# Patient Record
Sex: MALE | Race: WHITE | NOT HISPANIC OR LATINO | Employment: FULL TIME | URBAN - METROPOLITAN AREA
[De-identification: names, ages, dates, MRNs, and addresses within clinical notes are randomized per-mention and may not be internally consistent; named-entity substitution may affect disease eponyms.]

---

## 2017-09-19 ENCOUNTER — APPOINTMENT (OUTPATIENT)
Dept: CT IMAGING | Facility: CLINIC | Age: 52
DRG: 155 | End: 2017-09-19
Attending: EMERGENCY MEDICINE
Payer: COMMERCIAL

## 2017-09-19 ENCOUNTER — HOSPITAL ENCOUNTER (INPATIENT)
Facility: CLINIC | Age: 52
LOS: 2 days | Discharge: HOME | DRG: 155 | End: 2017-09-21
Attending: EMERGENCY MEDICINE | Admitting: INTERNAL MEDICINE
Payer: COMMERCIAL

## 2017-09-19 ENCOUNTER — APPOINTMENT (OUTPATIENT)
Dept: ULTRASOUND IMAGING | Facility: CLINIC | Age: 52
DRG: 155 | End: 2017-09-19
Attending: PHYSICIAN ASSISTANT
Payer: COMMERCIAL

## 2017-09-19 DIAGNOSIS — M54.50 CHRONIC LOW BACK PAIN WITHOUT SCIATICA, UNSPECIFIED BACK PAIN LATERALITY: Primary | ICD-10-CM

## 2017-09-19 DIAGNOSIS — K76.82 HEPATIC ENCEPHALOPATHY (H): ICD-10-CM

## 2017-09-19 DIAGNOSIS — G89.29 CHRONIC LOW BACK PAIN WITHOUT SCIATICA, UNSPECIFIED BACK PAIN LATERALITY: Primary | ICD-10-CM

## 2017-09-19 PROBLEM — G31.2 ALCOHOLIC ENCEPHALOPATHY (H): Status: ACTIVE | Noted: 2017-09-19

## 2017-09-19 LAB
ALBUMIN SERPL-MCNC: 3.9 G/DL (ref 3.4–5)
ALP SERPL-CCNC: 108 U/L (ref 40–150)
ALT SERPL W P-5'-P-CCNC: 78 U/L (ref 0–70)
AMMONIA PLAS-SCNC: 132 UMOL/L (ref 10–50)
AMPHETAMINES UR QL SCN: NEGATIVE
ANION GAP SERPL CALCULATED.3IONS-SCNC: 12 MMOL/L (ref 3–14)
AST SERPL W P-5'-P-CCNC: 46 U/L (ref 0–45)
BARBITURATES UR QL: NEGATIVE
BASOPHILS # BLD AUTO: 0 10E9/L (ref 0–0.2)
BASOPHILS NFR BLD AUTO: 0.9 %
BENZODIAZ UR QL: NEGATIVE
BILIRUB SERPL-MCNC: 0.4 MG/DL (ref 0.2–1.3)
BUN SERPL-MCNC: 6 MG/DL (ref 7–30)
CALCIUM SERPL-MCNC: 8.7 MG/DL (ref 8.5–10.1)
CANNABINOIDS UR QL SCN: NEGATIVE
CHLORIDE SERPL-SCNC: 99 MMOL/L (ref 94–109)
CO2 SERPL-SCNC: 24 MMOL/L (ref 20–32)
COCAINE UR QL: NEGATIVE
CREAT SERPL-MCNC: 0.66 MG/DL (ref 0.66–1.25)
DIFFERENTIAL METHOD BLD: ABNORMAL
EOSINOPHIL # BLD AUTO: 0 10E9/L (ref 0–0.7)
EOSINOPHIL NFR BLD AUTO: 0.9 %
ERYTHROCYTE [DISTWIDTH] IN BLOOD BY AUTOMATED COUNT: 12.3 % (ref 10–15)
ETHANOL SERPL-MCNC: 0.02 G/DL
FERRITIN SERPL-MCNC: 126 NG/ML (ref 26–388)
GFR SERPL CREATININE-BSD FRML MDRD: >90 ML/MIN/1.7M2
GLUCOSE BLDC GLUCOMTR-MCNC: 150 MG/DL (ref 70–99)
GLUCOSE SERPL-MCNC: 150 MG/DL (ref 70–99)
HCT VFR BLD AUTO: 37.7 % (ref 40–53)
HGB BLD-MCNC: 13.7 G/DL (ref 13.3–17.7)
IMM GRANULOCYTES # BLD: 0 10E9/L (ref 0–0.4)
IMM GRANULOCYTES NFR BLD: 0.2 %
INR PPP: 1.05 (ref 0.86–1.14)
IRON SATN MFR SERPL: 20 % (ref 15–46)
IRON SERPL-MCNC: 88 UG/DL (ref 35–180)
LYMPHOCYTES # BLD AUTO: 1.1 10E9/L (ref 0.8–5.3)
LYMPHOCYTES NFR BLD AUTO: 24.2 %
MAGNESIUM SERPL-MCNC: 1.8 MG/DL (ref 1.6–2.3)
MCH RBC QN AUTO: 33.1 PG (ref 26.5–33)
MCHC RBC AUTO-ENTMCNC: 36.3 G/DL (ref 31.5–36.5)
MCV RBC AUTO: 91 FL (ref 78–100)
MONOCYTES # BLD AUTO: 0.4 10E9/L (ref 0–1.3)
MONOCYTES NFR BLD AUTO: 8.9 %
NEUTROPHILS # BLD AUTO: 2.9 10E9/L (ref 1.6–8.3)
NEUTROPHILS NFR BLD AUTO: 64.9 %
NRBC # BLD AUTO: 0 10*3/UL
NRBC BLD AUTO-RTO: 0 /100
OPIATES UR QL SCN: NEGATIVE
PCP UR QL SCN: NEGATIVE
PLATELET # BLD AUTO: 204 10E9/L (ref 150–450)
POTASSIUM SERPL-SCNC: 3.3 MMOL/L (ref 3.4–5.3)
PROT SERPL-MCNC: 7.7 G/DL (ref 6.8–8.8)
RBC # BLD AUTO: 4.14 10E12/L (ref 4.4–5.9)
SODIUM SERPL-SCNC: 135 MMOL/L (ref 133–144)
T4 FREE SERPL-MCNC: 0.66 NG/DL (ref 0.76–1.46)
TIBC SERPL-MCNC: 436 UG/DL (ref 240–430)
TSH SERPL DL<=0.005 MIU/L-ACNC: 0.32 MU/L (ref 0.4–4)
WBC # BLD AUTO: 4.5 10E9/L (ref 4–11)

## 2017-09-19 PROCEDURE — 84439 ASSAY OF FREE THYROXINE: CPT | Performed by: EMERGENCY MEDICINE

## 2017-09-19 PROCEDURE — 25000132 ZZH RX MED GY IP 250 OP 250 PS 637: Performed by: INTERNAL MEDICINE

## 2017-09-19 PROCEDURE — 99285 EMERGENCY DEPT VISIT HI MDM: CPT | Mod: 25

## 2017-09-19 PROCEDURE — 85610 PROTHROMBIN TIME: CPT | Performed by: EMERGENCY MEDICINE

## 2017-09-19 PROCEDURE — 80320 DRUG SCREEN QUANTALCOHOLS: CPT | Performed by: EMERGENCY MEDICINE

## 2017-09-19 PROCEDURE — 25000132 ZZH RX MED GY IP 250 OP 250 PS 637: Performed by: PHYSICIAN ASSISTANT

## 2017-09-19 PROCEDURE — 85025 COMPLETE CBC W/AUTO DIFF WBC: CPT | Performed by: EMERGENCY MEDICINE

## 2017-09-19 PROCEDURE — 25000128 H RX IP 250 OP 636: Performed by: PHYSICIAN ASSISTANT

## 2017-09-19 PROCEDURE — 83735 ASSAY OF MAGNESIUM: CPT | Performed by: EMERGENCY MEDICINE

## 2017-09-19 PROCEDURE — 12000000 ZZH R&B MED SURG/OB

## 2017-09-19 PROCEDURE — 84443 ASSAY THYROID STIM HORMONE: CPT | Performed by: EMERGENCY MEDICINE

## 2017-09-19 PROCEDURE — 99223 1ST HOSP IP/OBS HIGH 75: CPT | Mod: AI | Performed by: PHYSICIAN ASSISTANT

## 2017-09-19 PROCEDURE — 86704 HEP B CORE ANTIBODY TOTAL: CPT | Performed by: EMERGENCY MEDICINE

## 2017-09-19 PROCEDURE — 25000128 H RX IP 250 OP 636: Performed by: EMERGENCY MEDICINE

## 2017-09-19 PROCEDURE — 87340 HEPATITIS B SURFACE AG IA: CPT | Performed by: EMERGENCY MEDICINE

## 2017-09-19 PROCEDURE — 80053 COMPREHEN METABOLIC PANEL: CPT | Performed by: EMERGENCY MEDICINE

## 2017-09-19 PROCEDURE — 25000132 ZZH RX MED GY IP 250 OP 250 PS 637: Performed by: EMERGENCY MEDICINE

## 2017-09-19 PROCEDURE — 80307 DRUG TEST PRSMV CHEM ANLYZR: CPT | Performed by: EMERGENCY MEDICINE

## 2017-09-19 PROCEDURE — 82140 ASSAY OF AMMONIA: CPT | Performed by: EMERGENCY MEDICINE

## 2017-09-19 PROCEDURE — 00000146 ZZHCL STATISTIC GLUCOSE BY METER IP

## 2017-09-19 PROCEDURE — 83540 ASSAY OF IRON: CPT | Performed by: EMERGENCY MEDICINE

## 2017-09-19 PROCEDURE — 25000125 ZZHC RX 250: Performed by: PHYSICIAN ASSISTANT

## 2017-09-19 PROCEDURE — 83550 IRON BINDING TEST: CPT | Performed by: EMERGENCY MEDICINE

## 2017-09-19 PROCEDURE — 76705 ECHO EXAM OF ABDOMEN: CPT

## 2017-09-19 PROCEDURE — 96361 HYDRATE IV INFUSION ADD-ON: CPT

## 2017-09-19 PROCEDURE — 96374 THER/PROPH/DIAG INJ IV PUSH: CPT

## 2017-09-19 PROCEDURE — 70450 CT HEAD/BRAIN W/O DYE: CPT

## 2017-09-19 PROCEDURE — 82728 ASSAY OF FERRITIN: CPT | Performed by: EMERGENCY MEDICINE

## 2017-09-19 PROCEDURE — 86803 HEPATITIS C AB TEST: CPT | Performed by: EMERGENCY MEDICINE

## 2017-09-19 RX ORDER — LANOLIN ALCOHOL/MO/W.PET/CERES
100 CREAM (GRAM) TOPICAL DAILY
Status: DISCONTINUED | OUTPATIENT
Start: 2017-09-20 | End: 2017-09-21 | Stop reason: HOSPADM

## 2017-09-19 RX ORDER — PROCHLORPERAZINE 25 MG
25 SUPPOSITORY, RECTAL RECTAL EVERY 12 HOURS PRN
Status: DISCONTINUED | OUTPATIENT
Start: 2017-09-19 | End: 2017-09-21 | Stop reason: HOSPADM

## 2017-09-19 RX ORDER — ONDANSETRON 4 MG/1
4 TABLET, ORALLY DISINTEGRATING ORAL EVERY 6 HOURS PRN
Status: DISCONTINUED | OUTPATIENT
Start: 2017-09-19 | End: 2017-09-21 | Stop reason: HOSPADM

## 2017-09-19 RX ORDER — CYANOCOBALAMIN 1000 UG/ML
1 INJECTION, SOLUTION INTRAMUSCULAR; SUBCUTANEOUS WEEKLY
COMMUNITY

## 2017-09-19 RX ORDER — DIAZEPAM 5 MG
10 TABLET ORAL EVERY 30 MIN PRN
Status: DISCONTINUED | OUTPATIENT
Start: 2017-09-19 | End: 2017-09-21 | Stop reason: HOSPADM

## 2017-09-19 RX ORDER — HYDROCODONE BITARTRATE AND ACETAMINOPHEN 5; 325 MG/1; MG/1
1 TABLET ORAL EVERY 4 HOURS PRN
Status: DISCONTINUED | OUTPATIENT
Start: 2017-09-19 | End: 2017-09-21 | Stop reason: HOSPADM

## 2017-09-19 RX ORDER — LISINOPRIL 20 MG/1
20 TABLET ORAL DAILY
Status: DISCONTINUED | OUTPATIENT
Start: 2017-09-20 | End: 2017-09-21 | Stop reason: HOSPADM

## 2017-09-19 RX ORDER — DIAZEPAM 10 MG/2ML
5-10 INJECTION, SOLUTION INTRAMUSCULAR; INTRAVENOUS EVERY 30 MIN PRN
Status: DISCONTINUED | OUTPATIENT
Start: 2017-09-19 | End: 2017-09-21 | Stop reason: HOSPADM

## 2017-09-19 RX ORDER — TESTOSTERONE 1.62 MG/G
3 GEL TRANSDERMAL DAILY PRN
COMMUNITY

## 2017-09-19 RX ORDER — AMLODIPINE AND BENAZEPRIL HYDROCHLORIDE 5; 20 MG/1; MG/1
1 CAPSULE ORAL DAILY
COMMUNITY

## 2017-09-19 RX ORDER — FOLIC ACID 1 MG/1
1 TABLET ORAL DAILY
Status: DISCONTINUED | OUTPATIENT
Start: 2017-09-20 | End: 2017-09-21 | Stop reason: HOSPADM

## 2017-09-19 RX ORDER — MULTIPLE VITAMINS W/ MINERALS TAB 9MG-400MCG
1 TAB ORAL DAILY
Status: DISCONTINUED | OUTPATIENT
Start: 2017-09-20 | End: 2017-09-21 | Stop reason: HOSPADM

## 2017-09-19 RX ORDER — SODIUM CHLORIDE 9 MG/ML
1000 INJECTION, SOLUTION INTRAVENOUS CONTINUOUS
Status: DISCONTINUED | OUTPATIENT
Start: 2017-09-19 | End: 2017-09-19

## 2017-09-19 RX ORDER — SODIUM CHLORIDE AND POTASSIUM CHLORIDE 150; 900 MG/100ML; MG/100ML
INJECTION, SOLUTION INTRAVENOUS CONTINUOUS
Status: DISCONTINUED | OUTPATIENT
Start: 2017-09-19 | End: 2017-09-20

## 2017-09-19 RX ORDER — PANTOPRAZOLE SODIUM 40 MG/1
40 TABLET, DELAYED RELEASE ORAL
Status: DISCONTINUED | OUTPATIENT
Start: 2017-09-19 | End: 2017-09-21 | Stop reason: HOSPADM

## 2017-09-19 RX ORDER — POTASSIUM CHLORIDE 1.5 G/1.58G
20-40 POWDER, FOR SOLUTION ORAL
Status: DISCONTINUED | OUTPATIENT
Start: 2017-09-19 | End: 2017-09-21 | Stop reason: HOSPADM

## 2017-09-19 RX ORDER — LIDOCAINE 40 MG/G
CREAM TOPICAL
Status: DISCONTINUED | OUTPATIENT
Start: 2017-09-19 | End: 2017-09-21 | Stop reason: HOSPADM

## 2017-09-19 RX ORDER — ONDANSETRON 2 MG/ML
4 INJECTION INTRAMUSCULAR; INTRAVENOUS EVERY 6 HOURS PRN
Status: DISCONTINUED | OUTPATIENT
Start: 2017-09-19 | End: 2017-09-21 | Stop reason: HOSPADM

## 2017-09-19 RX ORDER — PROCHLORPERAZINE MALEATE 5 MG
5-10 TABLET ORAL EVERY 6 HOURS PRN
Status: DISCONTINUED | OUTPATIENT
Start: 2017-09-19 | End: 2017-09-21 | Stop reason: HOSPADM

## 2017-09-19 RX ORDER — POTASSIUM CHLORIDE 1500 MG/1
20-40 TABLET, EXTENDED RELEASE ORAL
Status: DISCONTINUED | OUTPATIENT
Start: 2017-09-19 | End: 2017-09-21 | Stop reason: HOSPADM

## 2017-09-19 RX ORDER — POTASSIUM CHLORIDE 7.45 MG/ML
10 INJECTION INTRAVENOUS
Status: DISCONTINUED | OUTPATIENT
Start: 2017-09-19 | End: 2017-09-21 | Stop reason: HOSPADM

## 2017-09-19 RX ORDER — OXCARBAZEPINE 300 MG/1
900 TABLET, FILM COATED ORAL EVERY EVENING
Status: DISCONTINUED | OUTPATIENT
Start: 2017-09-19 | End: 2017-09-21 | Stop reason: HOSPADM

## 2017-09-19 RX ORDER — ESOMEPRAZOLE MAGNESIUM 40 MG/1
40 CAPSULE, DELAYED RELEASE ORAL
Status: DISCONTINUED | OUTPATIENT
Start: 2017-09-19 | End: 2017-09-19 | Stop reason: CLARIF

## 2017-09-19 RX ORDER — QUETIAPINE FUMARATE 25 MG/1
25-50 TABLET, FILM COATED ORAL EVERY 6 HOURS PRN
Status: DISCONTINUED | OUTPATIENT
Start: 2017-09-19 | End: 2017-09-21 | Stop reason: HOSPADM

## 2017-09-19 RX ORDER — HYDRALAZINE HYDROCHLORIDE 20 MG/ML
10 INJECTION INTRAMUSCULAR; INTRAVENOUS EVERY 4 HOURS PRN
Status: DISCONTINUED | OUTPATIENT
Start: 2017-09-19 | End: 2017-09-21 | Stop reason: HOSPADM

## 2017-09-19 RX ORDER — ACETAMINOPHEN 325 MG/1
650 TABLET ORAL EVERY 4 HOURS PRN
Status: DISCONTINUED | OUTPATIENT
Start: 2017-09-19 | End: 2017-09-21 | Stop reason: HOSPADM

## 2017-09-19 RX ORDER — MESALAMINE 800 MG/1
2400 TABLET, DELAYED RELEASE ORAL 2 TIMES DAILY
Status: DISCONTINUED | OUTPATIENT
Start: 2017-09-19 | End: 2017-09-21 | Stop reason: HOSPADM

## 2017-09-19 RX ORDER — NICOTINE 21 MG/24HR
1 PATCH, TRANSDERMAL 24 HOURS TRANSDERMAL DAILY
Status: DISCONTINUED | OUTPATIENT
Start: 2017-09-19 | End: 2017-09-21 | Stop reason: HOSPADM

## 2017-09-19 RX ORDER — LANOLIN ALCOHOL/MO/W.PET/CERES
1000 CREAM (GRAM) TOPICAL DAILY
Status: DISCONTINUED | OUTPATIENT
Start: 2017-09-20 | End: 2017-09-21 | Stop reason: HOSPADM

## 2017-09-19 RX ORDER — LACTULOSE 10 G/15ML
20 SOLUTION ORAL 4 TIMES DAILY
Status: DISCONTINUED | OUTPATIENT
Start: 2017-09-19 | End: 2017-09-19

## 2017-09-19 RX ORDER — LORAZEPAM 2 MG/ML
0.5 INJECTION INTRAMUSCULAR ONCE
Status: COMPLETED | OUTPATIENT
Start: 2017-09-19 | End: 2017-09-19

## 2017-09-19 RX ORDER — AMOXICILLIN 250 MG
1-2 CAPSULE ORAL 2 TIMES DAILY PRN
Status: DISCONTINUED | OUTPATIENT
Start: 2017-09-19 | End: 2017-09-19

## 2017-09-19 RX ORDER — CALCIUM CARBONATE 500 MG/1
500-1000 TABLET, CHEWABLE ORAL 4 TIMES DAILY PRN
Status: DISCONTINUED | OUTPATIENT
Start: 2017-09-19 | End: 2017-09-21 | Stop reason: HOSPADM

## 2017-09-19 RX ORDER — LACTULOSE 10 G/15ML
30 SOLUTION ORAL ONCE
Status: COMPLETED | OUTPATIENT
Start: 2017-09-19 | End: 2017-09-19

## 2017-09-19 RX ORDER — NALOXONE HYDROCHLORIDE 0.4 MG/ML
.1-.4 INJECTION, SOLUTION INTRAMUSCULAR; INTRAVENOUS; SUBCUTANEOUS
Status: DISCONTINUED | OUTPATIENT
Start: 2017-09-19 | End: 2017-09-20

## 2017-09-19 RX ORDER — HYDROCODONE BITARTRATE AND ACETAMINOPHEN 5; 325 MG/1; MG/1
1 TABLET ORAL EVERY 4 HOURS PRN
COMMUNITY

## 2017-09-19 RX ORDER — POTASSIUM CHLORIDE 29.8 MG/ML
20 INJECTION INTRAVENOUS
Status: DISCONTINUED | OUTPATIENT
Start: 2017-09-19 | End: 2017-09-21 | Stop reason: HOSPADM

## 2017-09-19 RX ORDER — CHOLESTYRAMINE 4 G/9G
1 POWDER, FOR SUSPENSION ORAL DAILY
Status: DISCONTINUED | OUTPATIENT
Start: 2017-09-20 | End: 2017-09-19

## 2017-09-19 RX ORDER — AMLODIPINE AND BENAZEPRIL HYDROCHLORIDE 5; 20 MG/1; MG/1
1 CAPSULE ORAL DAILY
Status: DISCONTINUED | OUTPATIENT
Start: 2017-09-20 | End: 2017-09-19 | Stop reason: CLARIF

## 2017-09-19 RX ORDER — OXCARBAZEPINE 300 MG/1
600 TABLET, FILM COATED ORAL EVERY MORNING
Status: DISCONTINUED | OUTPATIENT
Start: 2017-09-20 | End: 2017-09-21 | Stop reason: HOSPADM

## 2017-09-19 RX ORDER — AMLODIPINE BESYLATE 5 MG/1
5 TABLET ORAL DAILY
Status: DISCONTINUED | OUTPATIENT
Start: 2017-09-20 | End: 2017-09-21 | Stop reason: HOSPADM

## 2017-09-19 RX ORDER — POTASSIUM CL/LIDO/0.9 % NACL 10MEQ/0.1L
10 INTRAVENOUS SOLUTION, PIGGYBACK (ML) INTRAVENOUS
Status: DISCONTINUED | OUTPATIENT
Start: 2017-09-19 | End: 2017-09-21 | Stop reason: HOSPADM

## 2017-09-19 RX ORDER — HALOPERIDOL 5 MG/ML
2 INJECTION INTRAMUSCULAR EVERY 6 HOURS PRN
Status: DISCONTINUED | OUTPATIENT
Start: 2017-09-19 | End: 2017-09-21 | Stop reason: HOSPADM

## 2017-09-19 RX ADMIN — FOLIC ACID: 5 INJECTION, SOLUTION INTRAMUSCULAR; INTRAVENOUS; SUBCUTANEOUS at 18:10

## 2017-09-19 RX ADMIN — POTASSIUM CHLORIDE AND SODIUM CHLORIDE: 900; 150 INJECTION, SOLUTION INTRAVENOUS at 15:09

## 2017-09-19 RX ADMIN — LACTULOSE 30 G: 10 SOLUTION ORAL at 13:57

## 2017-09-19 RX ADMIN — MESALAMINE 2400 MG: 800 TABLET, DELAYED RELEASE ORAL at 21:51

## 2017-09-19 RX ADMIN — NICOTINE 1 PATCH: 14 PATCH, EXTENDED RELEASE TRANSDERMAL at 17:58

## 2017-09-19 RX ADMIN — POTASSIUM CHLORIDE 40 MEQ: 1500 TABLET, EXTENDED RELEASE ORAL at 22:19

## 2017-09-19 RX ADMIN — PANTOPRAZOLE SODIUM 40 MG: 40 TABLET, DELAYED RELEASE ORAL at 18:02

## 2017-09-19 RX ADMIN — SODIUM CHLORIDE 1000 ML: 9 INJECTION, SOLUTION INTRAVENOUS at 12:29

## 2017-09-19 RX ADMIN — SODIUM CHLORIDE 1000 ML: 9 INJECTION, SOLUTION INTRAVENOUS at 13:24

## 2017-09-19 RX ADMIN — HYDRALAZINE HYDROCHLORIDE 10 MG: 20 INJECTION INTRAMUSCULAR; INTRAVENOUS at 22:20

## 2017-09-19 RX ADMIN — OXCARBAZEPINE 900 MG: 300 TABLET, FILM COATED ORAL at 21:51

## 2017-09-19 RX ADMIN — LORAZEPAM 0.5 MG: 2 INJECTION INTRAMUSCULAR; INTRAVENOUS at 13:40

## 2017-09-19 ASSESSMENT — ENCOUNTER SYMPTOMS
CONFUSION: 1
ABDOMINAL PAIN: 0
TREMORS: 1

## 2017-09-19 NOTE — ED NOTES
"[facility]  ED Nurse Handoff Report    ED Chief complaint: Shaking (since 1030, has some memory loss this am. States he had a glass of vodka last night)      ED Diagnosis:   Final diagnoses:   Hepatic encephalopathy (H)       Code Status: Full Code    Allergies: No Known Allergies    Activity level - Baseline/Home:  Independent    Activity Level - Current:   Stand with Assist of 2     Needed?: No    Isolation: No  Infection: Not Applicable    Bariatric?: No    Vital Signs:   Vitals:    09/19/17 1139 09/19/17 1230 09/19/17 1231 09/19/17 1300   BP:  (!) 186/105  (!) 187/116   Resp:  15 15 12   Temp: 98.5  F (36.9  C)      TempSrc: Oral      SpO2:   96% 97%   Height:           Cardiac Rhythm: ,   Cardiac  Cardiac Rhythm: Atrial flutter    Pain level:      Is this patient confused?: Yes    Patient Report: Initial Complaint: memory loss, shaking, confusion  Focused Assessment: Gordon Guzman is a 52 year old male with a history of neuropathy, hyperlipidemia, hypertension who presents with shaking. The patient states that this started at about 1030--he feels \"shaky\" and \"warm\". This has happened before, but it usually goes away after he eats something. The patient has not eaten anything yet today. His wife adds that the patient has had some memory loss this morning, is repeating himself and that his eyes are \"glassy\". He denies chest pain, abdominal pain, falling, or hitting his head. Of note, he had a glass of vodka last night and mentioned having one this morning. Patient is found to have an elevated ammonia level.   Tests Performed: labs, urine, head CT  Abnormal Results: see epic  Treatments provided: IV fluids, IV Lorazepam, po Lactulose    Family Comments: wife is at bedside    OBS brochure/video discussed/provided to patient: No    ED Medications:   Medications   0.9% sodium chloride BOLUS (0 mLs Intravenous Stopped 9/19/17 1324)     Followed by   0.9% sodium chloride infusion (1,000 mLs " Intravenous New Bag 9/19/17 1324)   LORazepam (ATIVAN) injection 0.5 mg (0.5 mg Intravenous Given 9/19/17 1340)   lactulose (CHRONULAC) solution 30 g (30 g Oral Given 9/19/17 1357)       Drips infusing?:  No      ED NURSE PHONE NUMBER: *[id number]

## 2017-09-19 NOTE — ED PROVIDER NOTES
"  History     Chief Complaint:  Shaking    HPI   Gordon Guzman is a 52 year old male with a history of neuropathy, hyperlipidemia, hypertension who presents with shaking. The patient states that this started at about 1030--he feels \"shaky\" and \"warm\". This has happened before, but it usually goes away after he eats something. The patient has not eaten anything yet today. His wife adds that the patient has had some memory loss this morning, is repeating himself and that his eyes are \"glassy\". He denies chest pain, abdominal pain, falling, or hitting his head. Of note, he had a glass of vodka last night and mentioned having one this morning.    Allergies:  No Known Drug Allergies     Medications:    Norco  Lisinopril  Folgard  Kenalog  Asacol  Questran  Nexium     Past Medical History:    Hypertension  Hyperlipidemia  Ulcerative colitis  Neuropathy    Past Surgical History:    History reviewed. No pertinent past surgical history.    Family History:    History reviewed. No pertinent family history.     Social History:  The patient was accompanied to the ED by his wife.  Smoking Status: Former  Alcohol Use: Daily, drank last night  Marital Status:       Review of Systems   Eyes:        \"Glassy\"   Cardiovascular: Negative for chest pain.   Gastrointestinal: Negative for abdominal pain.   Neurological: Positive for tremors.   Psychiatric/Behavioral: Positive for confusion.   All other systems reviewed and are negative.      Physical Exam   First Vitals:  BP: (!) 191/100  Heart Rate: 99  Temp: 98.5  F (36.9  C)  Resp: 16  Height: 177.8 cm (5' 10\")  SpO2: 98 %    Physical Exam   Constitutional: He appears well-developed.   HENT:   Head: Normocephalic and atraumatic.   Right Ear: External ear normal.   Mouth/Throat: Oropharynx is clear and moist.   Eyes: Conjunctivae and EOM are normal. Pupils are equal, round, and reactive to light.   Neck: Normal range of motion. Neck supple. No JVD present.   Cardiovascular: Normal " rate, regular rhythm and normal heart sounds.    Pulmonary/Chest: Effort normal and breath sounds normal.   Abdominal: Soft. Bowel sounds are normal. He exhibits no distension. There is no tenderness. There is no rebound.   Musculoskeletal: Normal range of motion.   Lymphadenopathy:     He has no cervical adenopathy.   Neurological: He is alert. He is disoriented. He displays normal reflexes. No cranial nerve deficit. He exhibits normal muscle tone. Coordination normal. GCS eye subscore is 4. GCS verbal subscore is 4. GCS motor subscore is 6.   Pt unable to state the year. Speech in tact, recognizes his wife.    Wife states still off of baseline.   Skin: Skin is warm and dry.   Psychiatric: He has a normal mood and affect. His behavior is normal. Judgment normal.   Nursing note and vitals reviewed.    Emergency Department Course     Laboratory:  Laboratory findings were communicated with the patient and family who voiced understanding of the findings.  Ammonia (on ice): 132 (HH)    CBC:  AWNL. (WBC 4.5 , HGB 13.7, )   INR: 1.05  Ethanol level: 0.02 (H)    CMP: Potassium 3.3 (L), Glucose 150 (H), BUN 6 (L), ALT 78 (H), AST 46 (H) o/w WNL (Creatinine 0.66)  TSH with free T4 reflex: 0.32 (L)   Magnesium: 1.8  Glucose by meter: 150 (H)      Interventions:  1229 - NS Bolus 1,000mL IV  1324 - NS Infusion 1,000mL IV  1340 - Ativan 0.5mg IV      Emergency Department Course:  Nursing notes and vitals reviewed.  1210: I performed an exam of the patient as documented above.   1323: Patient rechecked and updated.   1345: I spoke with Dr. Arnold of the Hospitalist service regarding patient's presentation, findings, and plan of care.  I discussed the treatment plan with the patient. They expressed understanding of this plan and consented to admission. I discussed the patient with Dr. Arnold, who will admit the patient to a monitored bed for further evaluation and treatment.    I personally reviewed the laboratory results with  the Patient and spouse and answered all related questions prior to admission.      Impression & Plan      Medical Decision Making:  Gordon Guzman is a 52 year old male who presents to the emergency department today with   with confusion and not acting right at home. Patient is noted to have some drinks on a  regular basis. Liver diseases have been present in past, although diagnosis is likely a fatty liver. Patient had extensive laboratory workup for altered mental status that does identify significant elevation in ammonia level. Clinical impressions are for hepatic encephalopathy. I would recommend lactulose treatment and hospital admission and GI consultation for definitive evaluation. A CT of the head is pending. Planned admit medicine diagnosis is number one, altered mental status secondary to hepatic encephalopathy.     Diagnosis:    ICD-10-CM    1. Hepatic encephalopathy (H) K72.90        Disposition:  Admitted to Med. Surg. bed     Scribe Disclosure:  IBee, am serving as a scribe at 12:10 PM on 9/19/2017 to document services personally performed by Junior Fay MD based on my observations and the provider's statements to me.   9/19/2017    EMERGENCY DEPARTMENT       Junior Fay MD  09/23/17 [id number]

## 2017-09-19 NOTE — H&P
DATE OF SERVICE:  09/19/2017      PRIMARY CARE PHYSICIAN:  [facility].      CHIEF COMPLAINT:  Shaking and altered mental status.      HISTORY OF PRESENT ILLNESS:  Gordon Guzman is a 52-year-old male with a past medical history of ulcerative colitis, hypertension, steatohepatitis and alcohol use who presented to the Emergency Department due to complaints of shaking and some confusion.  Patient was diagnosed with pan ulcerative colitis in 2010.  He has been on Asacol daily since that time.  He also has a known history of steatohepatitis and underwent a liver biopsy in 11/2015 that revealed mild portal fibrosis and active inflammation.  It was most consistent with SHEEHAN, but alcoholic steatohepatitis could not be ruled out.  The patient does consume a significant amount of alcohol.  Today, he had the onset of shakiness around 10:30.  This has occurred before.  There is also some loss of memory today and the patient was noted to be repeating himself.  His wife was concerned and had him come into the hospital.  He otherwise has no fever, chills, nausea, vomiting, abdominal pain, melena or hematochezia.  There has been no chest pain or shortness of breath.  He denies any abdominal swelling or leg edema.  He has some trouble sleeping, but this is chronic.  In the ER, patient was noted to be hypertensive, but otherwise with stable vital signs.  His CMP was unremarkable other than a potassium 3.3, ALT of 78 and AST of 46.  Hemoglobin was normal at 13.7.  WBC is 4.5 and platelet count 204.  INR is normal at 1.05.  Ammonia level was checked and was elevated at 132.  Patient is being admitted to the hospital for further monitoring and treatment of possible hepatic encephalopathy.      PAST MEDICAL HISTORY:   1.  Steatohepatitis SHEEHAN versus alcoholic.   2.  Hypertension.   3.  Hyperlipidemia.   4.  Neuropathy.   5.  Alcohol use/dependence.   6.  Ulcerative colitis.      PAST SURGICAL HISTORY:  No previous  surgeries contributing.      SOCIAL HISTORY:  The patient is .  He is a former smoker.  He is a former heavy drinker but has cut back significantly, per his report, history of cocaine usage but not currently.      FAMILY HISTORY:  No known family history of liver disease.  Reviewed and otherwise noncontributory.      PRIOR TO ADMISSION MEDICATIONS:    Prior to Admission medications    Medication Sig Last Dose Taking? Auth Provider   Heating Pads (DRY HEAT HEATING PAD DELUXE) PADS 1 Units every 6 hours as needed  Yes Preet Arnold MD   cyanocobalamin (VITAMIN B12) 1000 MCG/ML injection Inject 1 mL into the muscle once a week  9/17/2017 Yes Unknown, Entered By History   FOLIC ACID PO Take 1 mg by mouth daily 9/18/2017 at Unknown time Yes Unknown, Entered By History   CYANOCOBALAMIN PO Take 1,000 mcg by mouth daily 9/18/2017 at Unknown time Yes Unknown, Entered By History   Thiamine HCl (VITAMIN B-1 PO) Take 100 mg by mouth daily 9/18/2017 at Unknown time Yes Unknown, Entered By History   Ondansetron HCl (ZOFRAN PO) Take 8 mg by mouth every 8 hours as needed for nausea or vomiting prn Yes Unknown, Entered By History   amLODIPine-benazepril (LOTREL) 5-20 MG per capsule Take 1 capsule by mouth daily 9/18/2017 at Unknown time Yes Unknown, Entered By History   HYDROcodone-acetaminophen (NORCO) 5-325 MG per tablet Take 1 tablet by mouth every 4 hours as needed for moderate to severe pain prn Yes Unknown, Entered By History   testosterone (ANDROGEL 1.62% PUMP) 20.25 MG/ACT gel Place 3 pumps onto the skin daily as needed Prescription is written as daily, but he is using less often unknown Yes Unknown, Entered By History   Omega-3 Fatty Acids (OMEGA-3 FISH OIL PO) Take 1 g by mouth daily  9/18/2017 at Unknown time Yes Reported, Patient   multivitamin, therapeutic with minerals (MULTI-VITAMIN) TABS Take 1 tablet by mouth daily 9/18/2017 at Unknown time Yes Reported, Patient   mesalamine (ASACOL HD) 800 MG EC tablet Take  2,400 mg by mouth 2 times daily  9/18/2017 at pm Yes Reported, Patient   cholestyramine (QUESTRAN) 4 G packet Take 1 packet by mouth daily 9/18/2017 at Unknown time Yes Reported, Patient   Esomeprazole Magnesium (NEXIUM PO) Take 40 mg by mouth 2 times daily (before meals)  9/18/2017 at Unknown time Yes Reported, Patient     ALLERGIES:  No known drug allergies.      REVIEW OF SYSTEMS:  A complete 10-point review of systems was performed and is negative other than the items previously mentioned above in HPI.      PHYSICAL EXAMINATION:   VITAL SIGNS:  Blood pressure 167/99, heart rate 90, temperature 97.8, respiratory rate 16, oxygen saturation 100% on room air.   GENERAL:  Patient is alert, oriented to person, place and situation, cooperative, lying in bed in no apparent distress.   HEENT:  Pupils equal and round.  Extraocular movements intact.  No scleral icterus.   HEENT:  Head normocephalic, atraumatic.  Throat, lips, mucosa and tongue appear moist.   NECK:  Supple.   CARDIOVASCULAR:  Heart regular rate and rhythm, no murmurs, rubs or gallops.  Distal pulses are intact.   PULMONARY:  Lungs are clear to auscultation bilaterally, no crackles, wheezes or rhonchi.  Breathing is nonlabored   GASTROINTESTINAL:  Abdomen is soft, obese, nontender, nondistended with normoactive bowel sounds.   MUSCULOSKELETAL:  Patient moves all 4 extremities equally.   NEUROLOGIC:  Grossly normal.  Cranial nerves II-XII are grossly normal.  Motor function intact.  No focal deficits, maybe some mild tremors.   SKIN:  No rashes on limited exam.  Skin is warm and dry.   PSYCHIATRIC:  Normal mood and affect.      LABORATORY DATA:  CMP with potassium 3.3, BUN 6, ALT of 78, AST 46, glucose 150, otherwise within normal limits.  TSH 0.32.  Ammonia is 132.  CBC:  WBC 4.5, hemoglobin 13.7, hematocrit 37.7, platelet count 204, RBC 4.14.  INR is 1.05.  Ethanol level 0.02.  Urine tox screen is negative.      CT head without contrast:  Normal head CT,  per Radiology.      ASSESSMENT AND PLAN:  Gordon Guzman is a 52-year-old male with a past medical history of steatohepatitis, alcoholic versus SHEEHAN along with ulcerative colitis, hypertension, hyperlipidemia and neuropathy who presented to the Emergency Department with complaints of shakiness and confusion.  He was found to have an elevated ammonia level and is being admitted to the hospital for further monitoring and treatment of possible hepatic encephalopathy.   1.  Possible hepatic encephalopathy.  Patient's ammonia level is elevated, but there is no asterixis.  According to Gastroenterology, this laboratory is nonspecific and can be falsely elevated when drawn incorrectly.  Gastroenterology has been formally consulted.  He does not have any known cirrhosis and his previous liver biopsy only showed mild fibrosis a couple years ago.  Will defer further laboratory work  to Gastroenterology.  Will hydrate with intravenous fluids.  Continue with lactulose initiated in the Emergency Room.  Encouraged alcohol cessation.  Consider liver biopsy.  Will obtain a right upper quadrant ultrasound.  Monitor mental status.  Also, repeat a CMP for morning to monitor transaminases.   2.  Pan ulcerative colitis.  This was diagnosed in 2010.  He has been managed on Asacol since that time.  Last colonoscopy in 2015 was entirely normal.  He has been on Questran as well to help him have formed stools.  Will hold Questran while on lactulose.  Otherwise, continue with prior to admission Asacol, management per Gastroenterology.   3.  Hypertension.  Will hold prior to admission amlodipine/benazepril per Gastroenterology recommendations.  Will have hydralazine available as needed.  He may need to be taken off the benazepril and started on a different blood pressure medication.   4.  Alcohol abuse/dependence.  Patient has a history of alcohol abuse but has cut significantly back.  He still drinks alcohol frequently.  Recommend complete  cessation.  Will monitor for signs of alcohol withdrawal, initiate on CIWA protocol with symptom-triggered Valium dosing.  Will continue with oral triple vitamin therapy.  Will defer Psychiatry and Chemical Dependency consult at this time.   5.  Abnormal TSH.  This came back at 0.32.  I have added on a free T4 level.  He does not have any known history of hyperthyroidism and does not seem to display any symptoms of this.   6.  Deep venous thrombosis prophylaxis:  This will be with pressure compression devices.   7.  Code status:  Full code.      This [facility]  He is in agreement with my assessment and plan of care.         PREET ARNOLD MD       As dictated by BETH EDWARD PA-C            D: 2017 16:43   T: 2017 17:33   MT: LINDA      Name:     ALEJANDRA SOTO   MRN:      [id number]        Account:      [id number]   :      1965           Admitted:     [id number]      Document: [id number]       cc: [facility]

## 2017-09-19 NOTE — IP AVS SNAPSHOT
MRN:[id number]                      After Visit Summary   9/19/2017    Gordon Guzman    MRN: [id number]           Thank you!     Thank you for choosing [facility] for your care. Our goal is always to provide you with excellent care. Hearing back from our patients is one way we can continue to improve our services. Please take a few minutes to complete the written survey that you may receive in the mail after you visit with us. Thank you!        Patient Information     Date Of Birth          1965        Designated Caregiver       Most Recent Value    Caregiver    Will someone help with your care after discharge? no    Name of designated caregiver Lane Guzman    Phone number of caregiver [phone]      About your hospital stay     You were admitted on:  September 19, 2017 You last received care in the:  [place] [facility] [address] [facility]    You were discharged on:  September 21, 2017        Reason for your hospital stay       An episode of transient confusion and memory loss of unclear etiology.                  Who to Call     For medical emergencies, please call 911.  For non-urgent questions about your medical care, please call your primary care provider or clinic, [phone]          Attending Provider     Provider Specialty    Junior Fay MD Emergency Medicine    Clayton, Preet CHAUDHARY MD Internal Medicine       Primary Care Provider Office Phone # Fax #    [facility] [phone]      After Care Instructions     Activity       Your activity upon discharge: as tolerated but no driving for a month.            Diet       Follow this diet upon discharge:   Regular Diet Adult            Discharge Instructions       No driving for one month.                  Follow-up Appointments     Follow-up and recommended labs and tests        Follow up with primary care provider, [facility], within 7 days.                  Pending Results     Date and  "Time Order Name Status Description    2017 0939 Surgical pathology exam In process             Statement of Approval     Ordered          17 7314  I have reviewed and agree with all the recommendations and orders detailed in this document.  EFFECTIVE NOW     Approved and electronically signed by:  Preet Arnold MD           17 [id number]  I have reviewed and agree with all the recommendations and orders detailed in this document.  EFFECTIVE NOW     Approved and electronically signed by:  Preet Arnold MD             Admission Information     Date & Time Provider Department Dept. Phone    2017 Preet Arnold MD [place] [address] [facility] [phone]      Your Vitals Were     Blood Pressure Pulse Temperature Respirations Height Pulse Oximetry    139/78 (BP Location: Left arm) 91 97.8  F (36.6  C) (Oral) 18 1.778 m (5' 10\") 97%      MyChart Information     [employer] lets you send messages to your doctor, view your test results, renew your prescriptions, schedule appointments and more. To sign up, go to [email or web address] . Click on \"Log in\" on the left side of the screen, which will take you to the Welcome page. Then click on \"Sign up Now\" on the right side of the page.     You will be asked to enter the access code listed below, as well as some personal information. Please follow the directions to create your username and password.     Your access code is: [email or web address]  Expires: 2017  1:19 PM     Your access code will  in 90 days. If you need help or a new code, please call your [place] clinic or [phone].        Care EveryWhere ID     This is your Care EveryWhere ID. This could be used by other organizations to access your [place] [facility] medical records  [id number]        Equal Access to Services     GERALD LANDIN : Agustin Robb, diane gr, emerald casey. So [facility] [phone].    ATENCIÓN: " Si habla mariluz, tiene a christianson disposición servicios gratuitos de asistencia lingüística. Rodrigo tidwell [phone].    We comply with applicable federal civil rights laws and [place] laws. We do not discriminate on the basis of race, color, national origin, age, disability sex, sexual orientation or gender identity.               Review of your medicines      START taking        Dose / Directions    Dry Heat Heating Pad Deluxe Pads   Used for:  Chronic low back pain without sciatica, unspecified back pain laterality        Dose:  1 Units   1 Units every 6 hours as needed   Quantity:  1 each   Refills:  0         CONTINUE these medicines which have NOT CHANGED        Dose / Directions    amLODIPine-benazepril 5-20 MG per capsule   Commonly known as:  LOTREL        Dose:  1 capsule   Take 1 capsule by mouth daily   Refills:  0       ANDROGEL 1.62% PUMP 20.25 MG/ACT gel   Generic drug:  testosterone        Dose:  3 pump   Place 3 pumps onto the skin daily as needed Prescription is written as daily, but he is using less often   Refills:  0       cholestyramine 4 G Packet   Commonly known as:  QUESTRAN        Dose:  1 packet   Take 1 packet by mouth daily   Refills:  0       * cyanocobalamin 1000 MCG/ML injection   Commonly known as:  VITAMIN B12        Dose:  1 mL   Inject 1 mL into the muscle once a week   Refills:  0       * CYANOCOBALAMIN PO        Dose:  1000 mcg   Take 1,000 mcg by mouth daily   Refills:  0       FOLIC ACID PO        Dose:  1 mg   Take 1 mg by mouth daily   Refills:  0       HYDROcodone-acetaminophen 5-325 MG per tablet   Commonly known as:  NORCO        Dose:  1 tablet   Take 1 tablet by mouth every 4 hours as needed for moderate to severe pain   Refills:  0       mesalamine 800 MG EC tablet   Commonly known as:  ASACOL HD        Dose:  2400 mg   Take 2,400 mg by mouth 2 times daily   Refills:  0       Multi-vitamin Tabs tablet        Dose:  1 tablet   Take 1 tablet by mouth daily   Refills:  0        NEXIUM PO        Dose:  40 mg   Take 40 mg by mouth 2 times daily (before meals)   Refills:  0       OMEGA-3 FISH OIL PO        Dose:  1 g   Take 1 g by mouth daily   Refills:  0       VITAMIN B-1 PO        Dose:  100 mg   Take 100 mg by mouth daily   Refills:  0       ZOFRAN PO        Dose:  8 mg   Take 8 mg by mouth every 8 hours as needed for nausea or vomiting   Refills:  0       * Notice:  This list has 2 medication(s) that are the same as other medications prescribed for you. Read the directions carefully, and ask your doctor or other care provider to review them with you.      STOP taking     OXCARBAZEPINE PO                Where to get your medicines      Some of these will need a paper prescription and others can be bought over the counter. Ask your nurse if you have questions.     Bring a paper prescription for each of these medications     Dry Heat Heating Pad Deluxe Pads                Protect others around you: Learn how to safely use, store and throw away your medicines at www.disposemymeds.org.             Medication List: This is a list of all your medications and when to take them. Check marks below indicate your daily home schedule. Keep this list as a reference.      Medications           Morning Afternoon Evening Bedtime As Needed    amLODIPine-benazepril 5-20 MG per capsule   Commonly known as:  LOTREL   Take 1 capsule by mouth daily   Next Dose Due:  Restart at home tomorrow.                                   ANDROGEL 1.62% PUMP 20.25 MG/ACT gel   Place 3 pumps onto the skin daily as needed Prescription is written as daily, but he is using less often   Generic drug:  testosterone                                   cholestyramine 4 G Packet   Commonly known as:  QUESTRAN   Take 1 packet by mouth daily   Next Dose Due:  Restart at home                                   * cyanocobalamin 1000 MCG/ML injection   Commonly known as:  VITAMIN B12   Inject 1 mL into the muscle once a week   Next Dose  Due:  Continue previous schedule                                * CYANOCOBALAMIN PO   Take 1,000 mcg by mouth daily   Last time this was given:  1,000 mcg on 9/21/2017  8:58 AM   Next Dose Due:  Tomorrow morning                                   Dry Heat Heating Pad Deluxe Pads   1 Units every 6 hours as needed                                   FOLIC ACID PO   Take 1 mg by mouth daily   Last time this was given:  1 mg on 9/21/2017  8:58 AM   Next Dose Due:  Tomorrow morning                                   HYDROcodone-acetaminophen 5-325 MG per tablet   Commonly known as:  NORCO   Take 1 tablet by mouth every 4 hours as needed for moderate to severe pain                                   mesalamine 800 MG EC tablet   Commonly known as:  ASACOL HD   Take 2,400 mg by mouth 2 times daily   Last time this was given:  2,400 mg on 9/21/2017  8:58 AM   Next Dose Due:  This evening                                      Multi-vitamin Tabs tablet   Take 1 tablet by mouth daily   Last time this was given:  1 tablet on 9/21/2017  8:58 AM   Next Dose Due:  Tomorrow morning                                   NEXIUM PO   Take 40 mg by mouth 2 times daily (before meals)   Next Dose Due:  This evening                                      OMEGA-3 FISH OIL PO   Take 1 g by mouth daily   Next Dose Due:  Restart at home                                   VITAMIN B-1 PO   Take 100 mg by mouth daily   Last time this was given:  100 mg on 9/21/2017  8:58 AM   Next Dose Due:  Tomorrow morning                                   ZOFRAN PO   Take 8 mg by mouth every 8 hours as needed for nausea or vomiting                                   * Notice:  This list has 2 medication(s) that are the same as other medications prescribed for you. Read the directions carefully, and ask your doctor or other care provider to review them with you.

## 2017-09-19 NOTE — PHARMACY-ADMISSION MEDICATION HISTORY
Admission medication history interview status for the 9/19/2017  admission is complete. See EPIC admission navigator for prior to admission medications     Medication history source reliability:Moderate, pt interview. Wife brought pics of some med bottles, pharmacy    Actions taken by pharmacist (provider contacted, etc):Called [facility] and updated care everywhere and surescripts     Additional medication history information not noted on PTA med list : took no meds today    Medication reconciliation/reorder completed by provider prior to medication history? No    Time spent in this activity: 45 minutes    Prior to Admission medications    Medication Sig Last Dose Taking? Auth Provider   OXCARBAZEPINE PO Take 600 mg by mouth every morning 9/18/2017 at am Yes Unknown, Entered By History   OXCARBAZEPINE PO Take 900 mg by mouth every evening 9/18/2017 at pm Yes Unknown, Entered By History   cyanocobalamin (VITAMIN B12) 1000 MCG/ML injection Inject 1 mL into the muscle once a week  9/17/2017 Yes Unknown, Entered By History   FOLIC ACID PO Take 1 mg by mouth daily 9/18/2017 at Unknown time Yes Unknown, Entered By History   CYANOCOBALAMIN PO Take 1,000 mcg by mouth daily 9/18/2017 at Unknown time Yes Unknown, Entered By History   Thiamine HCl (VITAMIN B-1 PO) Take 100 mg by mouth daily 9/18/2017 at Unknown time Yes Unknown, Entered By History   Ondansetron HCl (ZOFRAN PO) Take 8 mg by mouth every 8 hours as needed for nausea or vomiting prn Yes Unknown, Entered By History   amLODIPine-benazepril (LOTREL) 5-20 MG per capsule Take 1 capsule by mouth daily 9/18/2017 at Unknown time Yes Unknown, Entered By History   HYDROcodone-acetaminophen (NORCO) 5-325 MG per tablet Take 1 tablet by mouth every 4 hours as needed for moderate to severe pain prn Yes Unknown, Entered By History   testosterone (ANDROGEL 1.62% PUMP) 20.25 MG/ACT gel Place 3 pumps onto the skin daily as needed Prescription is written as daily, but  he is using less often unknown Yes Unknown, Entered By History   Omega-3 Fatty Acids (OMEGA-3 FISH OIL PO) Take 1 g by mouth daily  9/18/2017 at Unknown time Yes Reported, Patient   multivitamin, therapeutic with minerals (MULTI-VITAMIN) TABS Take 1 tablet by mouth daily 9/18/2017 at Unknown time Yes Reported, Patient   mesalamine (ASACOL HD) 800 MG EC tablet Take 2,400 mg by mouth 2 times daily  9/18/2017 at pm Yes Reported, Patient   cholestyramine (QUESTRAN) 4 G packet Take 1 packet by mouth daily 9/18/2017 at Unknown time Yes Reported, Patient   Esomeprazole Magnesium (NEXIUM PO) Take 40 mg by mouth 2 times daily (before meals)  9/18/2017 at Unknown time Yes Reported, Patient

## 2017-09-19 NOTE — CONSULTS
"[facility]  Gastroenterology Consultation    Gordon Guzman  [address] [facility] [address]  52 year old male    Admission Date/Time: 9/19/2017  Primary Care Provider: Rodrigo, Leslie Hendricks    We were asked to see the patient in consultation by MARIOLA Hall for evaluation of possible hepatic encephalopathy.        HPI:  Gordon Guzman is a 52 year old male who is known to me from past OV, I last saw him in 2013, for his ulcerative colitis who also has a PMH significant for HLD, HTN, and neuropathy who is admitted with \"shaking\" and possible altered mental status.      The patient was diagnosed with pan ulcerative colitis in 2010.  He has been on Asacol 4.8grams daily since that time.  His last colonoscopy was in 2015 and was entirely normal.  He has been on Questran as well to help him have formed stools.      He also has a history of steatohepatitis.  He underwent a liver biopsy in November of 2015 that revealed mild portal fibrosis and active inflammation.  It was most consistent with SHEEHAN but alcoholic steatohepatitis could not be ruled out.  When I saw the patient in 2011, he was consuming a significant amount of alcohol with four shots per night.      He is now here for evaluation of \"shakiness\".  This began this morning around 1030.  It has occurred before.  Apparently there was also some loss of memory today and the patient was repeating himself.      On admission, CMP was mainly unremarkable aside from a potassium of 3.3, ALT of 78 and AST of 46.  Hgb was normal at 13.7, WBC at 4.5, Plts normal at 204.  INR normal at 1.05.  Ammonia was checked (patient does not have history of cirrhosis) and was elevated at 132.  I do not see any documentation in the ER note of asterixis.  The patient denies any fever, chills, nausea, vomiting, abdominal pain, melena or hematochezia.  There has been no chest pain or shortness of breath.  He denies issues with reversal of his sleep wake " cycle.  He has trouble sleeping at night but this is not new.  He does not sleep during the day frequently.  He denies any abdominal swelling or leg edema.      The patient reports that he used to drink heavily, prior to 2015.  Since that time however he drinks perhaps a beer a night if that.  He buys 1.75L of vodka but it takes him months to finish this.  He will binge drink when camping and have 12-18 beers on a weekend, but this is rare.  He has a history of intranasal cocaine usage but not IVDU.  He denies blood transfusion prior to 1993.      He does have HTN but no DM.  He previously had HLD but he reports this resolved.      Here is Dr. Vega's note from April 5th of 2017:      This is a 51-year-old man who presents in followup for ulcerative pancolitis. He was diagnosed in 2010. He has been treated with Asacol HD 4.8 grams daily since that time. He has occasional episodes of loose stools that can be urgent. There is rare incontinence, about every two months. He has not had any bleeding or significant abdominal pain, or consistent diarrhea. He typically has two to four stools in the morning and then will not have a stool throughout the rest of the day. He has found Questran to be very effective at promoting more formed stools. If he misses dosing of his daily Questran, his stools will be looser. He uses occasional Imodium, but not consistently.    He does have nonalcoholic steatohepatitis. He had a liver biopsy in November 2015 that revealed mild portal fibrosis. He has had normalization of liver tests since that time. Serologic evaluation for cause of elevated liver test was otherwise normal. Of note, his labs have been performed outside of our office by his primary provider. He has no current complaints today.      ROS: A comprehensive ten point review of systems was negative aside from those in mentioned in the HPI.      MEDICATIONS:   No current facility-administered medications on file prior to  "encounter.   Current Outpatient Prescriptions on File Prior to Encounter:  Omega-3 Fatty Acids (OMEGA-3 FISH OIL PO) Take 1 g by mouth daily    multivitamin, therapeutic with minerals (MULTI-VITAMIN) TABS Take 1 tablet by mouth daily   mesalamine (ASACOL HD) 800 MG EC tablet Take 2,400 mg by mouth 2 times daily    cholestyramine (QUESTRAN) 4 G packet Take 1 packet by mouth daily   Esomeprazole Magnesium (NEXIUM PO) Take 40 mg by mouth 2 times daily (before meals)        ALLERGIES: No Known Allergies    Past Medical History:   Diagnosis Date     Hypertension      Ulcerative colitis (H)        No past surgical history on file.      SOCIAL HISTORY:  Social History   Substance Use Topics     Smoking status: Former Smoker     Smokeless tobacco: Not on file     Alcohol use Yes      Comment: daily, drank last night       FAMILY HISTORY:  No known liver disease    PHYSICAL EXAM:   BP (!) 172/109  Temp 98.5  F (36.9  C) (Oral)  Resp 16  Ht 1.778 m (5' 10\")  SpO2 100%    Constitutional: NAD, comfortable  Cardiovascular: RRR, normal S1 and S2, no r/c/g/m  Respiratory: CTAB  Psychiatric: mentation appears normal and affect normal  Head: Normocephalic. Atraumatic.    Neck: Neck supple. No adenopathy. Thyroid symmetric, normal size, trachea midline  Eyes:  PERRL, no icterus  ENT: Hearing adequate, pharynx normal without erythema or exudate  Abdomen:   Auscultation: +BS  Appearance: obese  Palpation: soft, nontender, nondistended  NEURO: grossly negative, some abnormal movement--asterixis versus tremors, difficult to tell  SKIN: no suspicious lesions or rashes  LYMPH:   anterior cervical: no adenopathy  posterior cervical: no adenopathy  supraclavicular: no adenopathy          ADDITIONAL COMMENTS:   I reviewed the patient's new clinical lab test results.   Recent Labs   Lab Test  09/19/17   1208  10/30/15   0852  09/18/13   1242  09/18/13   1224   WBC  4.5   --   3.6*  Specimen not received Lost in transit, notified ED at 1230 "   HGB  13.7   --   13.7  Specimen not received Lost in transit, notified ED at 1230   MCV  91   --   93  Specimen not received Lost in transit, notified ED at 1230   PLT  204  210  200  Specimen not received Lost in transit, notified ED at 1230   INR  1.05  0.99   --    --      Recent Labs   Lab Test  09/19/17   1208  09/18/13   1224   NA  135  134   POTASSIUM  3.3*  3.5   CHLORIDE  99  97   CO2  24  21   BUN  6*  11   CR  0.66  0.81   ANIONGAP  12  15   EDELMIRA  8.7  9.3   GLC  150*  131*     Recent Labs   Lab Test  09/19/17   1208  09/18/13   1224   ALBUMIN  3.9  4.6   BILITOTAL  0.4  0.7   ALT  78*  141*   AST  46*  270*   ALKPHOS  108  96       Colonoscopy, Dr. Vega, 11/23/2015:  Findings:  Anal canal:  normal  Minimal patchy edema vs scarring throughout the colon, but otherwise normal.    Pathology: NONE      .    CONSULTATION ASSESSMENT AND PLAN:    Active Problems:  Elevated ammonia    Assessment: 52 year old male with PMH significant for ulcerative colitis and fatty liver disease in the absence of significant fibrosis, who is admitted with tremors and also possible encephalopathy.  Ammonia is elevated but there is no asterixis, this lab is non-specific and can be falsely elevated when drawn and processed incorrectly (needs to be drawn artererially, put on ice and processed in five minutes) so it is not clear if this represents HE.      Plan:   -I would not check further ammonia levels unless they are drawn arterially  -If altered mental status persists, consider neurological consultation  -It seems unlikely the patient has progressed to stage IV fibrosis in two years from stage I without another inciting factor (minimal ETOH per his report, no known hepatitis).  Consider liver biopsy--patient would like to proceed  -Check insulin, Hemoglobin A1c and hepatitis serologies, iron studies  -Agree with US of the abdomen      I will discuss the patient's findings and plan with Dr. Tao who will independently  "examine the patient and add further recommendations as necessary.          Vera Laureano, PAC  [facility]  Office:  [phone] call if needed after 5PM  Cell:  [phone], not available after 5PM at this number    Staff addendum: 52 yom with UC admitted with UE tremors, found to have elevated ammonia. Feels better this afternoon. UC at baseline    BP (!) 167/99  Temp 97.8  F (36.6  C) (Oral)  Resp 16  Ht 1.778 m (5' 10\")  SpO2 100%  Gen: awake alert NAD  Cor: RRR  Abd: S NT ND    A/P: 52 yom with tremors, UC, elevated lipase. Doubt underlying liver disease as the cause given stage I fibrosis on liver bx only 2 years ago.  -Agree with u/s, biopsy  -Neuro consult if GI eval negative    Bee Tao MD  [facility]  Pager [phone]  Office [phone]      "

## 2017-09-20 ENCOUNTER — APPOINTMENT (OUTPATIENT)
Dept: ULTRASOUND IMAGING | Facility: CLINIC | Age: 52
DRG: 155 | End: 2017-09-20
Attending: PHYSICIAN ASSISTANT
Payer: COMMERCIAL

## 2017-09-20 ENCOUNTER — APPOINTMENT (OUTPATIENT)
Dept: MRI IMAGING | Facility: CLINIC | Age: 52
DRG: 155 | End: 2017-09-20
Attending: PSYCHIATRY & NEUROLOGY
Payer: COMMERCIAL

## 2017-09-20 LAB
ALBUMIN SERPL-MCNC: 3.9 G/DL (ref 3.4–5)
ALP SERPL-CCNC: 103 U/L (ref 40–150)
ALT SERPL W P-5'-P-CCNC: 74 U/L (ref 0–70)
AMMONIA PLAS-SCNC: 55 UMOL/L (ref 10–50)
ANION GAP SERPL CALCULATED.3IONS-SCNC: 9 MMOL/L (ref 3–14)
AST SERPL W P-5'-P-CCNC: 49 U/L (ref 0–45)
BILIRUB SERPL-MCNC: 0.7 MG/DL (ref 0.2–1.3)
BUN SERPL-MCNC: 4 MG/DL (ref 7–30)
CALCIUM SERPL-MCNC: 8.8 MG/DL (ref 8.5–10.1)
CHLORIDE SERPL-SCNC: 103 MMOL/L (ref 94–109)
CO2 SERPL-SCNC: 23 MMOL/L (ref 20–32)
CREAT SERPL-MCNC: 0.64 MG/DL (ref 0.66–1.25)
ERYTHROCYTE [DISTWIDTH] IN BLOOD BY AUTOMATED COUNT: 12.6 % (ref 10–15)
GFR SERPL CREATININE-BSD FRML MDRD: >90 ML/MIN/1.7M2
GLUCOSE SERPL-MCNC: 137 MG/DL (ref 70–99)
HBA1C MFR BLD: 6.3 % (ref 4.3–6)
HBV CORE AB SERPL QL IA: NONREACTIVE
HBV SURFACE AG SERPL QL IA: NONREACTIVE
HCT VFR BLD AUTO: 38.1 % (ref 40–53)
HCV AB SERPL QL IA: NONREACTIVE
HGB BLD-MCNC: 13.7 G/DL (ref 13.3–17.7)
INSULIN SERPL-ACNC: 16 MU/L (ref 3–25)
MCH RBC QN AUTO: 33.6 PG (ref 26.5–33)
MCHC RBC AUTO-ENTMCNC: 36 G/DL (ref 31.5–36.5)
MCV RBC AUTO: 93 FL (ref 78–100)
PLATELET # BLD AUTO: 220 10E9/L (ref 150–450)
POTASSIUM SERPL-SCNC: 3.7 MMOL/L (ref 3.4–5.3)
PROT SERPL-MCNC: 8.1 G/DL (ref 6.8–8.8)
RBC # BLD AUTO: 4.08 10E12/L (ref 4.4–5.9)
SODIUM SERPL-SCNC: 135 MMOL/L (ref 133–144)
WBC # BLD AUTO: 6.1 10E9/L (ref 4–11)

## 2017-09-20 PROCEDURE — 99207 ZZC CDG-MDM COMPONENT: MEETS MODERATE - UP CODED: CPT | Performed by: INTERNAL MEDICINE

## 2017-09-20 PROCEDURE — 85027 COMPLETE CBC AUTOMATED: CPT | Performed by: PHYSICIAN ASSISTANT

## 2017-09-20 PROCEDURE — 76942 ECHO GUIDE FOR BIOPSY: CPT

## 2017-09-20 PROCEDURE — 25000128 H RX IP 250 OP 636: Performed by: INTERNAL MEDICINE

## 2017-09-20 PROCEDURE — 36415 COLL VENOUS BLD VENIPUNCTURE: CPT | Performed by: PHYSICIAN ASSISTANT

## 2017-09-20 PROCEDURE — 25000132 ZZH RX MED GY IP 250 OP 250 PS 637: Performed by: INTERNAL MEDICINE

## 2017-09-20 PROCEDURE — 70553 MRI BRAIN STEM W/O & W/DYE: CPT

## 2017-09-20 PROCEDURE — 83036 HEMOGLOBIN GLYCOSYLATED A1C: CPT | Performed by: PHYSICIAN ASSISTANT

## 2017-09-20 PROCEDURE — 40000863 ZZH STATISTIC RADIOLOGY XRAY, US, CT, MAR, NM

## 2017-09-20 PROCEDURE — 12000000 ZZH R&B MED SURG/OB

## 2017-09-20 PROCEDURE — 83525 ASSAY OF INSULIN: CPT | Performed by: PHYSICIAN ASSISTANT

## 2017-09-20 PROCEDURE — 82140 ASSAY OF AMMONIA: CPT | Performed by: PHYSICIAN ASSISTANT

## 2017-09-20 PROCEDURE — 88313 SPECIAL STAINS GROUP 2: CPT | Mod: 26 | Performed by: RADIOLOGY

## 2017-09-20 PROCEDURE — 86706 HEP B SURFACE ANTIBODY: CPT | Performed by: PHYSICIAN ASSISTANT

## 2017-09-20 PROCEDURE — 88313 SPECIAL STAINS GROUP 2: CPT | Performed by: RADIOLOGY

## 2017-09-20 PROCEDURE — 25000125 ZZHC RX 250: Performed by: RADIOLOGY

## 2017-09-20 PROCEDURE — 80053 COMPREHEN METABOLIC PANEL: CPT | Performed by: PHYSICIAN ASSISTANT

## 2017-09-20 PROCEDURE — 25000128 H RX IP 250 OP 636: Performed by: PHYSICIAN ASSISTANT

## 2017-09-20 PROCEDURE — A9585 GADOBUTROL INJECTION: HCPCS | Performed by: INTERNAL MEDICINE

## 2017-09-20 PROCEDURE — 40000061 ZZH STATISTIC EEG TIME EA 10 MIN

## 2017-09-20 PROCEDURE — 95819 EEG AWAKE AND ASLEEP: CPT

## 2017-09-20 PROCEDURE — 88307 TISSUE EXAM BY PATHOLOGIST: CPT | Performed by: RADIOLOGY

## 2017-09-20 PROCEDURE — 25000132 ZZH RX MED GY IP 250 OP 250 PS 637: Performed by: PHYSICIAN ASSISTANT

## 2017-09-20 PROCEDURE — 99233 SBSQ HOSP IP/OBS HIGH 50: CPT | Performed by: INTERNAL MEDICINE

## 2017-09-20 PROCEDURE — 0FB23ZX EXCISION OF LEFT LOBE LIVER, PERCUTANEOUS APPROACH, DIAGNOSTIC: ICD-10-PCS | Performed by: RADIOLOGY

## 2017-09-20 PROCEDURE — 88307 TISSUE EXAM BY PATHOLOGIST: CPT | Mod: 26 | Performed by: RADIOLOGY

## 2017-09-20 RX ORDER — FLUMAZENIL 0.1 MG/ML
0.2 INJECTION, SOLUTION INTRAVENOUS
Status: DISCONTINUED | OUTPATIENT
Start: 2017-09-20 | End: 2017-09-21

## 2017-09-20 RX ORDER — GADOBUTROL 604.72 MG/ML
10 INJECTION INTRAVENOUS ONCE
Status: COMPLETED | OUTPATIENT
Start: 2017-09-20 | End: 2017-09-20

## 2017-09-20 RX ORDER — NALOXONE HYDROCHLORIDE 0.4 MG/ML
.1-.4 INJECTION, SOLUTION INTRAMUSCULAR; INTRAVENOUS; SUBCUTANEOUS
Status: DISCONTINUED | OUTPATIENT
Start: 2017-09-20 | End: 2017-09-21 | Stop reason: HOSPADM

## 2017-09-20 RX ORDER — LIDOCAINE HYDROCHLORIDE 10 MG/ML
1-30 INJECTION, SOLUTION EPIDURAL; INFILTRATION; INTRACAUDAL; PERINEURAL
Status: DISCONTINUED | OUTPATIENT
Start: 2017-09-20 | End: 2017-09-21 | Stop reason: HOSPADM

## 2017-09-20 RX ORDER — FENTANYL CITRATE 50 UG/ML
25-50 INJECTION, SOLUTION INTRAMUSCULAR; INTRAVENOUS EVERY 5 MIN PRN
Status: DISCONTINUED | OUTPATIENT
Start: 2017-09-20 | End: 2017-09-21

## 2017-09-20 RX ORDER — LIDOCAINE HYDROCHLORIDE 10 MG/ML
9 INJECTION, SOLUTION EPIDURAL; INFILTRATION; INTRACAUDAL; PERINEURAL ONCE
Status: COMPLETED | OUTPATIENT
Start: 2017-09-20 | End: 2017-09-20

## 2017-09-20 RX ORDER — ESOMEPRAZOLE MAGNESIUM 40 MG/1
40 CAPSULE, DELAYED RELEASE ORAL
Status: DISCONTINUED | OUTPATIENT
Start: 2017-09-20 | End: 2017-09-20 | Stop reason: CLARIF

## 2017-09-20 RX ORDER — LORAZEPAM 2 MG/ML
.5-1 INJECTION INTRAMUSCULAR ONCE
Status: COMPLETED | OUTPATIENT
Start: 2017-09-20 | End: 2017-09-20

## 2017-09-20 RX ORDER — AMLODIPINE AND BENAZEPRIL HYDROCHLORIDE 5; 20 MG/1; MG/1
1 CAPSULE ORAL DAILY
Status: DISCONTINUED | OUTPATIENT
Start: 2017-09-20 | End: 2017-09-20 | Stop reason: CLARIF

## 2017-09-20 RX ADMIN — GADOBUTROL 10 ML: 604.72 INJECTION INTRAVENOUS at 22:16

## 2017-09-20 RX ADMIN — MESALAMINE 2400 MG: 800 TABLET, DELAYED RELEASE ORAL at 09:44

## 2017-09-20 RX ADMIN — MULTIPLE VITAMINS W/ MINERALS TAB 1 TABLET: TAB at 09:43

## 2017-09-20 RX ADMIN — POTASSIUM CHLORIDE 20 MEQ: 1500 TABLET, EXTENDED RELEASE ORAL at 02:17

## 2017-09-20 RX ADMIN — OXCARBAZEPINE 600 MG: 300 TABLET, FILM COATED ORAL at 09:44

## 2017-09-20 RX ADMIN — LORAZEPAM 1 MG: 2 INJECTION INTRAMUSCULAR; INTRAVENOUS at 21:32

## 2017-09-20 RX ADMIN — FOLIC ACID 1 MG: 1 TABLET ORAL at 09:43

## 2017-09-20 RX ADMIN — OXCARBAZEPINE 900 MG: 300 TABLET, FILM COATED ORAL at 21:03

## 2017-09-20 RX ADMIN — PANTOPRAZOLE SODIUM 40 MG: 40 TABLET, DELAYED RELEASE ORAL at 17:26

## 2017-09-20 RX ADMIN — ACETAMINOPHEN 650 MG: 325 TABLET, FILM COATED ORAL at 09:44

## 2017-09-20 RX ADMIN — CYANOCOBALAMIN TAB 1000 MCG 1000 MCG: 1000 TAB at 09:43

## 2017-09-20 RX ADMIN — LIDOCAINE HYDROCHLORIDE 90 MG: 10 INJECTION, SOLUTION EPIDURAL; INFILTRATION; INTRACAUDAL; PERINEURAL at 09:04

## 2017-09-20 RX ADMIN — POTASSIUM CHLORIDE AND SODIUM CHLORIDE: 900; 150 INJECTION, SOLUTION INTRAVENOUS at 11:59

## 2017-09-20 RX ADMIN — POTASSIUM CHLORIDE AND SODIUM CHLORIDE: 900; 150 INJECTION, SOLUTION INTRAVENOUS at 02:26

## 2017-09-20 RX ADMIN — AMLODIPINE BESYLATE 5 MG: 5 TABLET ORAL at 09:42

## 2017-09-20 RX ADMIN — MESALAMINE 2400 MG: 800 TABLET, DELAYED RELEASE ORAL at 21:03

## 2017-09-20 RX ADMIN — Medication 100 MG: at 09:42

## 2017-09-20 RX ADMIN — LISINOPRIL 20 MG: 20 TABLET ORAL at 09:43

## 2017-09-20 RX ADMIN — NICOTINE 1 PATCH: 14 PATCH, EXTENDED RELEASE TRANSDERMAL at 09:44

## 2017-09-20 RX ADMIN — PANTOPRAZOLE SODIUM 40 MG: 40 TABLET, DELAYED RELEASE ORAL at 09:43

## 2017-09-20 ASSESSMENT — ACTIVITIES OF DAILY LIVING (ADL)
SWALLOWING: 0-->SWALLOWS FOODS/LIQUIDS WITHOUT DIFFICULTY
DRESS: 0-->INDEPENDENT
FALL_HISTORY_WITHIN_LAST_SIX_MONTHS: NO
BATHING: 0-->INDEPENDENT
AMBULATION: 0-->INDEPENDENT
RETIRED_EATING: 0-->INDEPENDENT
TOILETING: 0-->INDEPENDENT
COGNITION: 0 - NO COGNITION ISSUES REPORTED
TRANSFERRING: 0-->INDEPENDENT
RETIRED_COMMUNICATION: 0-->UNDERSTANDS/COMMUNICATES WITHOUT DIFFICULTY

## 2017-09-20 ASSESSMENT — VISUAL ACUITY
OU: NORMAL ACUITY

## 2017-09-20 NOTE — PROGRESS NOTES
"[facility]  [facility]  Gastroenterology Progress note    Interval History:      Patient feels well and has no complaints.        Vital Signs:      BP (!) 172/98  Pulse 91  Temp 97.8  F (36.6  C) (Oral)  Resp 16  Ht 1.778 m (5' 10\")  SpO2 97%  Temp (24hrs), Av.3  F (36.8  C), Min:97.8  F (36.6  C), Max:98.8  F (37.1  C)    No data found.      Intake/Output Summary (Last 24 hours) at 17 1018  Last data filed at 17 0600   Gross per 24 hour   Intake              240 ml   Output              400 ml   Net             -160 ml         Constitutional: NAD, comfortable    Additional Comments:  ROS, FH, SH: See initial GI consult for details.    Laboratory Data:  Recent Labs   Lab Test  17   0928  17   1208  10/30/15   0852  13   1242   WBC  6.1  4.5   --   3.6*   HGB  13.7  13.7   --   13.7   MCV  93  91   --   93   PLT  220  204  210  200   INR   --   1.05  0.99   --      Recent Labs   Lab Test  17   1208  13   1224   NA  135  134   POTASSIUM  3.3*  3.5   CHLORIDE  99  97   CO2  24  21   BUN  6*  11   CR  0.66  0.81   ANIONGAP  12  15   EDELMIRA  8.7  9.3     Recent Labs   Lab Test  17   1208  13   1224   ALBUMIN  3.9  4.6   BILITOTAL  0.4  0.7   ALT  78*  141*   AST  46*  270*   ALKPHOS  108  96         Assessment:    1.  Acute confusion  2.  Elevated ammonia  Assessment: 52 year old male with PMH significant for ulcerative colitis and fatty liver disease in the absence of significant fibrosis, who is admitted with tremors and also possible encephalopathy.  Ammonia is elevated but there is no asterixis, this lab is non-specific and can be falsely elevated when drawn and processed incorrectly (needs to be drawn artererially, put on ice and processed in five minutes) so it is not clear if this represents HE.  Liver bx pending.      Plan:    -Await bx results  -Treatment for thyroid dysfunction per IM if needed  -Consider " neurology consult          Vera Laureano, PAC  [facility]  Office:  [phone] call if needed after 5PM  Cell:  [phone], not available after 5PM at this number

## 2017-09-20 NOTE — PROGRESS NOTES
US guided liver biopsy completed per Dr Francois. BP elevated. Denies pain. Site D/I. Back to floor.

## 2017-09-20 NOTE — PROGRESS NOTES
See dictation. I suspect STEPHANIE which can sometimes produce spells like this. Will do over night oxymetry to screen but will need formal sleep study. Seizure in differential. Will get MRI, EEG

## 2017-09-20 NOTE — PLAN OF CARE
Problem: Goal Outcome Summary  Goal: Goal Outcome Summary  Outcome: Improving  Pt up independently in room.  Neuro's intact.  CIWA-A 0, 0   Liver biospy done,  Site upper abd, c/d/i.  Appetite good.  Neuro consulted.  GI following.   Tele- SR 1 deg AVB   Iv fluids d/c'd.  A& Ox4

## 2017-09-20 NOTE — CONSULTS
NEUROLOGY CONSULTATION       REQUESTING PHYSICIAN:  Preet Arnold MD      REASON FOR CONSULTATION:  Confusional episode.      HISTORY OF PRESENT ILLNESS:  Mr. Gordon Guzman is a pleasant 52-year-old gentleman seen in the company of his wife.  Yesterday morning after he got up, he says he felt strange.  He did not feel like himself.  It was somewhat hard to put into words.  He drove to his mother-in-law's house instead of driving to work.  His mother-in-law drove him back home and then he presented to the hospital.  He has vague recollections of some of this taking place, but he certainly does not recall everything that happened.  His memory is better for events that took place starting yesterday afternoon and forward.  He says that sometimes he may have spells like this, and if he eats something or has a can of pop, it will go away.  His wife notes that when she looked at him yesterday she could tell something was not right and he was sort of glassy eyed.  He may have had minor spells a couple of times over the last 6-8 months.  He does not have blackouts.  He has never had a seizure to his knowledge.  He does have a history of excessive alcohol use, but apparently, that was quite some time ago.  He still does drink occasionally.  In fact, the night before this episode, he said he had 1 shot.  He denied any alcohol use the morning of his presentation, although the emergency room note indicated that he may have had some.  He denies other drug use.  He is not aware of any history of seizures.  No recent significant head injury.  No michi vu type phenomenon.  No unusual or unexplained odors or tastes.      He does have other medical issues.  He has a history of steatohepatitis with a question of SHEEHAN versus alcoholic related.  His ammonia level was elevated when he presented and it was better the next day, although there is a question whether it was processed properly.  He has a history of hypertension, hyperlipidemia.  He  has had long-standing peripheral neuropathy for several years, and apparently, it was attributed to alcohol use and possible B12 deficiency.  He also has a history of ulcerative colitis.      MEDICATIONS PRIOR TO ADMISSION INCLUDE:   1.  Vitamins.   2.  Fish oil.   3.  Asacol HD 2400 mg b.i.d.   4.  Questran.   5.  Nexium.      ALLERGIES:  He has no known drug allergies.      SOCIAL HISTORY:  He is a former tobacco smoker.  Alcohol use is noted as above, with occasional alcohol consumption.      FAMILY HISTORY:  Noncontributory.      SYSTEM REVIEW:  No headache.  No chest pain or shortness of breath.  No recent cold or flu-like illness.      PHYSICAL EXAMINATION:   VITAL SIGNS:  His blood pressure was markedly elevated on admission, presently 158/87, temp 97.8, pulse 91, respirations 16.    NEUROLOGIC:  He is alert.  He is oriented.  Speech is fluent.  Extraocular movements are full, visual fields are grossly intact.  Pupils are equal and reactive to light.  Face, tongue and palate movements are symmetric.  Strength testing of the upper and lower extremities is normal.  Joint position sense is reduced in his toes.  Pinprick is reduced in his hands and in his lower extremities to the middle of his calves.  Deep tendon reflexes are diffusely hypoactive and are absent at his ankles.  No Babinski signs elicited.  Finger-to-nose testing is normal.  I did not ambulate him.      DIAGNOSTIC DATA:  Head CT scan upon admission was unrevealing.  Diffuse fatty infiltration was noted of his liver and a liver biopsy has been performed and results are pending.  Other lab work reveals normal electrolytes.  His ALT and AST are mildly elevated.  His ammonia yesterday was 132, today it is 55.      IMPRESSION AND PLAN:  This gentleman is describing a spell of transient confusion yesterday morning.  I should also add that he did have a trace alcohol level of 0.02 in the emergency room.  Etiology is certainly not entirely clear.   Consideration could be given to a metabolic etiology by virtue of his abnormal liver panel and an ammonia level.  I also have a high suspicion for possible obstructive sleep apnea.  I should add that his wife indicates he snores very heavily and they have always slept in separate bedrooms.  He does not feel well rested when he wakes up in the morning.  I have seen instances of severe sleep apnea produce episodes similar to this.  Given the fact that there was some alcohol in his system that was detectable the morning of admission, some relation to alcohol use cannot be excluded.  Finally, the possibility of some type of seizure would also be within the differential diagnosis.  I would like to get overnight oximetry as long as he is still here and that can help screen for potential apnea.  I still think he would probably benefit from a formal sleep study.  I am also going to check electroencephalogram and MRI because of the question of seizure.  He is a , and certainly, it would be very important to try to clarify what may be going on and it could have significant implications in terms of his occupation.  As it is, even if these tests are unrevealing, I would recommend he avoid driving for at least 1 month to be sure that this episode does not recur.  Planned workup was reviewed with the patient and his wife and they would like to proceed.         ANTONIA LEE MD             D: 2017 15:29   T: 2017 15:53   MT: TS      Name:     ALEJANDRA SOTO   MRN:      [id number]        Account:       [id number]   :      1965           Consult Date:  2017      Document: [id number]

## 2017-09-20 NOTE — PROGRESS NOTES
US guided liver biopsy completed per Dr Francois. Tolerated procedure well. No sedation given. Pt returned to floor per Radiology transport. Report called to floor RN.

## 2017-09-20 NOTE — PLAN OF CARE
Problem: Goal Outcome Summary  Goal: Goal Outcome Summary  Outcome: Improving  A/O, B/P elevated 170s to 160s/100. Hyrdalazine given per perameters  and no change in BP. Denies any pain or SOB. Up IND in room. Steady on feet. Nicotine patch to lt shoulder. Skin is flushed and mikki. C/O neuropathy at baseline in Rt and Lt LEs. Abdomen distened, BS+.  Has Hx of UC, mutiple loose stools. Amonia elevated yesterday. NPO for possible Liver biposy today. CIWAs 0,0,0.  K+ replaced, D/C pending. RN will continue to monitor.

## 2017-09-20 NOTE — PROGRESS NOTES
[facility]    Hospitalist Progress Note    Date of Service (when I saw the patient): 09/20/2017    Assessment & Plan    Gordon Guzman is a 53 yo gentleman w/ pmhx of steatohepatitis (alcoholic versus SHEEHAN), ulcerative colitis, HTN, HLD and neuropathy who [facility] with complaints of shakiness and confusion. CT head w/o contrast was negative.  He was found to have elevated NH4 level.    1.   Altered MS w/ transient memory loss:   Mr. Guzman does not remember much of yesterday morning prior to his arrival on the medical arredondo.  Etiology unclear.  CT head w/o contrast was neg for acute pathology.  Utox at admit was negative.  He was afebrile and w/o leukocytosis thus infectious work up was not performed.  Incidentally NH4 level was 137.  Pt has a h/o steatohepatitis w/ portal fibrosis per liver biopsy on 10/30/15.  Mr. Guzman denied excessive etoh consumption.  Pt's symptoms was initially attributed to HE in light of elevated NH4, thus treated w/ lactulose.  GI service think the elevated NH4 level was due to false positive, hence pt's symptoms are not due to HE.  Nonetheless, NH4 level is down to 55 this am and pt's MS is clear.  Mr. Guzman is a  and requested further evaluation of his altered MS by neurology service.  Order placed.     2.   Transaminitis:   H/o alcoholic hepatitis vs SHEEHAN per liver biopsy in 2015.  He is a former heavy drinker but has cut back significantly, per his report.  AST/ALT ratio is c/w alcoholic hepatitis.  Abd US on 9/19 showed diffuse fatty infiltration of the liver o/w neg.  Liver biopy performed this am, result pending.      3.   Pan ulcerative colitis:   Diagnosed in 2010.  He has been managed on Asacol since that time.  Last colonoscopy in 2015 was entirely normal.  He has been on Questran as well to help him have formed stools.     4.   Hypertension:   uncontrolled.  Reintroduce PTA amlodipine/benazepril.  Continue prn  hydralazine.    5.   Alcohol abuse/dependence:  Patient has a history of alcohol abuse but has cut significantly back.  He still drinks alcohol frequently.  Recommend complete cessation.  Will monitor for signs of alcohol withdrawal, initiate on CIWA protocol with symptom-triggered Valium dosing.  Will continue with oral triple vitamin therapy. Will defer Psychiatry and Chemical Dependency consult at this time.     6.   Decrease TSH and free T4 levels:   Likely euthyroid sickness.  Check levels in 4-6 wks.      7.   DVT prophylaxis:   SCDs.     8.   Code status:   Full code.       Disposition:   Expected discharge in am.      Preet Arnold MD.   Hospitalist.  [phone], pager.      Interval History   Has no complaints.  MS is clear.  Wife at bedside.  Mr. Guzman requested Neurology consultation.    -Data reviewed today: I reviewed all new labs and imaging results over the last 24 hours. I personally reviewed abd US and CT head w/o contrast that were performed yesterday.    Physical Exam   Temp: 97.8  F (36.6  C) Temp src: Oral BP: (!) 172/98 Pulse: 91 Heart Rate: 91 Resp: 16 SpO2: 97 % O2 Device: None (Room air)    There were no vitals filed for this visit.  Vital Signs with Ranges  Temp:  [97.8  F (36.6  C)-98.8  F (37.1  C)] 97.8  F (36.6  C)  Pulse:  [88-91] 91  Heart Rate:  [84-95] 91  Resp:  [12-18] 16  BP: (134-187)/() 172/98  SpO2:  [97 %-100 %] 97 %  I/O last 3 completed shifts:  In: 240 [P.O.:240]  Out: 400 [Urine:400]    General: aao x 3, NAD.  HEENT:  NC/AT, PERRL, EOMI, neck supple, no thyromegaly, op clear, mmm.  CVS:  NL s 1 and s2, no m/r/g.  Lungs:  CTA B/L.   Abd:  Soft, obese, + bs, NT, no rebound or gaurding, no fluid shift.  Ext:  No c/c.  Lymph:  No edema.  Neuro:  Nonfocal.  Musculoskeletal: No calf tenderness to palpation.    Skin:  No rash.  Psychiatry:  Mood and affect appropriate.    Medications     0.9% sodium chloride + KCl 20 mEq/L 125 mL/hr at 09/20/17 1159       [START ON  9/21/2017] influenza quadrivalent (PF) vacc age 3 yrs and older  0.5 mL Intramuscular Prior to discharge     sodium chloride (PF)  3 mL Intracatheter Q8H     IV Fluid with vitamins   Intravenous Q24H     cyanocobalamin (vitamin  B-12) tablet 1,000 mcg  1,000 mcg Oral Daily     folic acid (FOLVITE) tablet 1 mg  1 mg Oral Daily     mesalamine  2,400 mg Oral BID     multivitamin, therapeutic with minerals  1 tablet Oral Daily     OXcarbazepine  600 mg Oral QAM     thiamine tablet 100 mg  100 mg Oral Daily     pantoprazole  40 mg Oral BID AC     amLODIPine  5 mg Oral Daily    And     lisinopril  20 mg Oral Daily     nicotine   Transdermal Q8H     nicotine   Transdermal Daily     nicotine  1 patch Transdermal Daily     OXcarbazepine  900 mg Oral QPM       Data     Recent Labs  Lab 09/20/17  0928 09/19/17  1208   WBC 6.1 4.5   HGB 13.7 13.7   MCV 93 91    204   INR  --  1.05    135   POTASSIUM 3.7 3.3*   CHLORIDE 103 99   CO2 23 24   BUN 4* 6*   CR 0.64* 0.66   ANIONGAP 9 12   EDELMIRA 8.8 8.7   * 150*   ALBUMIN 3.9 3.9   PROTTOTAL 8.1 7.7   BILITOTAL 0.7 0.4   ALKPHOS 103 108   ALT 74* 78*   AST 49* 46*       Recent Results (from the past 24 hour(s))   CT Head w/o Contrast    Narrative    CT OF THE HEAD WITHOUT CONTRAST September 19, 2017 2:24 PM     HISTORY: Confusion, hypertension.    TECHNIQUE: Axial CT images of the head from the skull base to the  vertex were acquired without IV contrast. Radiation dose for this scan  was reduced using automated exposure control, adjustment of the mA  and/or kV according to patient size, or iterative reconstruction  technique.    COMPARISON: None.    FINDINGS: The ventricles and basal cisterns are within normal limits  in configuration. There is no midline shift. There are no extra-axial  fluid collections. Gray-white differentiation is well maintained.     No intracranial hemorrhage, mass or recent infarct.    The visualized paranasal sinuses are well-aerated. There  is no  mastoiditis. There are no fractures of the visualized bones.       Impression    IMPRESSION: Normal head CT.         BIANKA HANDLEY MD   US Abdomen Limited    Narrative    LIMITED ABDOMINAL (RIGHT UPPER QUADRANT) ULTRASOUND  9/19/2017 4:28 PM    HISTORY: Hepatic encephalopathy.    COMPARISON: None.    FINDINGS: The liver is normal in size. It demonstrates diffuse  increased echogenicity without focal lesions. The gallbladder is  normal without stones or sludge. No gallbladder wall thickening or  pericholecystic fluid is seen. The common bile duct is normal  measuring 0.4 cm in diameter. The visualized portion of the pancreas  is normal. The right kidney is normal with no hydronephrosis or  abnormal mass.      Impression    IMPRESSION: Diffuse fatty infiltration of the liver. Otherwise  unremarkable examination.    JARAD FANG MD   US Biopsy Liver    Narrative    ULTRASOUND LIVER PERCUTANEOUS BIOPSY  9/20/2017 9:04 AM     HISTORY: Possible encephalopathy.    COMPARISON: None.    PROCEDURE: After written and oral informed consent obtained,  ultrasound was used to melanie the skin over the liver. The subcutaneous  tissues overlying were prepped and draped in the usual sterile  fashion. The overlying subcutaneous tissues were anesthetized with  subcutaneous lidocaine. An 18-gauge biopsy device was advanced into  the left lobe of the liver and biopsy was obtained and submitted to  pathology. The patient tolerated the procedure well with no immediate  complications.    MEDICATIONS: 9 mL 1% lidocaine       Impression    IMPRESSION: Technically successful liver biopsy using ultrasound  guidance.

## 2017-09-21 VITALS
SYSTOLIC BLOOD PRESSURE: 139 MMHG | OXYGEN SATURATION: 97 % | HEART RATE: 91 BPM | RESPIRATION RATE: 18 BRPM | TEMPERATURE: 97.8 F | DIASTOLIC BLOOD PRESSURE: 78 MMHG | HEIGHT: 70 IN

## 2017-09-21 LAB
HBV SURFACE AB SERPL IA-ACNC: 0 M[IU]/ML
MAGNESIUM SERPL-MCNC: 1.6 MG/DL (ref 1.6–2.3)

## 2017-09-21 PROCEDURE — 99239 HOSP IP/OBS DSCHRG MGMT >30: CPT | Performed by: INTERNAL MEDICINE

## 2017-09-21 PROCEDURE — 25000132 ZZH RX MED GY IP 250 OP 250 PS 637: Performed by: PHYSICIAN ASSISTANT

## 2017-09-21 PROCEDURE — 36415 COLL VENOUS BLD VENIPUNCTURE: CPT | Performed by: INTERNAL MEDICINE

## 2017-09-21 PROCEDURE — 40000275 ZZH STATISTIC RCP TIME EA 10 MIN

## 2017-09-21 PROCEDURE — 83735 ASSAY OF MAGNESIUM: CPT | Performed by: INTERNAL MEDICINE

## 2017-09-21 PROCEDURE — 25000132 ZZH RX MED GY IP 250 OP 250 PS 637: Performed by: INTERNAL MEDICINE

## 2017-09-21 PROCEDURE — 90686 IIV4 VACC NO PRSV 0.5 ML IM: CPT | Performed by: INTERNAL MEDICINE

## 2017-09-21 PROCEDURE — 25000128 H RX IP 250 OP 636: Performed by: INTERNAL MEDICINE

## 2017-09-21 RX ADMIN — LISINOPRIL 20 MG: 20 TABLET ORAL at 08:58

## 2017-09-21 RX ADMIN — FOLIC ACID 1 MG: 1 TABLET ORAL at 08:58

## 2017-09-21 RX ADMIN — NICOTINE 1 PATCH: 14 PATCH, EXTENDED RELEASE TRANSDERMAL at 08:58

## 2017-09-21 RX ADMIN — ACETAMINOPHEN 650 MG: 325 TABLET, FILM COATED ORAL at 00:02

## 2017-09-21 RX ADMIN — PANTOPRAZOLE SODIUM 40 MG: 40 TABLET, DELAYED RELEASE ORAL at 06:33

## 2017-09-21 RX ADMIN — AMLODIPINE BESYLATE 5 MG: 5 TABLET ORAL at 08:58

## 2017-09-21 RX ADMIN — NICOTINE 1 PATCH: 14 PATCH, EXTENDED RELEASE TRANSDERMAL at 00:03

## 2017-09-21 RX ADMIN — CYANOCOBALAMIN TAB 1000 MCG 1000 MCG: 1000 TAB at 08:58

## 2017-09-21 RX ADMIN — ACETAMINOPHEN 650 MG: 325 TABLET, FILM COATED ORAL at 10:34

## 2017-09-21 RX ADMIN — Medication 100 MG: at 08:58

## 2017-09-21 RX ADMIN — INFLUENZA A VIRUS A/MICHIGAN/45/2015 X-275 (H1N1) ANTIGEN (FORMALDEHYDE INACTIVATED), INFLUENZA A VIRUS A/HONG KONG/4801/2014 X-263B (H3N2) ANTIGEN (FORMALDEHYDE INACTIVATED), INFLUENZA B VIRUS B/PHUKET/3073/2013 ANTIGEN (FORMALDEHYDE INACTIVATED), AND INFLUENZA B VIRUS B/BRISBANE/60/2008 ANTIGEN (FORMALDEHYDE INACTIVATED) 0.5 ML: 15; 15; 15; 15 INJECTION, SUSPENSION INTRAMUSCULAR at 10:38

## 2017-09-21 RX ADMIN — MESALAMINE 2400 MG: 800 TABLET, DELAYED RELEASE ORAL at 08:58

## 2017-09-21 RX ADMIN — MULTIPLE VITAMINS W/ MINERALS TAB 1 TABLET: TAB at 08:58

## 2017-09-21 RX ADMIN — OXCARBAZEPINE 600 MG: 300 TABLET, FILM COATED ORAL at 08:58

## 2017-09-21 ASSESSMENT — VISUAL ACUITY
OU: NORMAL ACUITY
OU: NORMAL ACUITY

## 2017-09-21 NOTE — DISCHARGE SUMMARY
[facility]    Discharge Summary  Hospitalist    Date of Admission:  9/19/2017  Date of Discharge:  9/21/2017  Discharging Provider: Preet Arnold MD  Date of Service (when I saw the patient): 09/21/17    Discharge Diagnoses   1)   An episode of transient confusion and memory loss.  2)   Elevated liver function test.  3)   Chronic low back pain.    PAST MEDICAL HISTORY:   1.  Steatohepatitis SHEEHAN versus alcoholic.   2.  Hypertension.   3.  Hyperlipidemia.   4.  Neuropathy.   5.  Alcohol use/dependence.   6.  Ulcerative colitis.       PAST SURGICAL HISTORY:   No previous surgeries contributing.     History of Present Illness   Gordon Guzman is a 51 yo gentleman w/ pmhx significant for steatohepatitis (alcoholic versus SHEEHAN), ulcerative colitis, HTN, HLD and neuropathy who presented to the [facility] ED on 9/19/17 with complaints of shakiness and confusion.  He woke up on the day of admit, 9/19/17 not in his usual state of health.  He felt strange.  He did not feel like himself.  It was somewhat hard to put into words per him.  He drove to his mother-in-law's house instead of driving to work.  He was confused and disoriented and could explain to his mother why he was there.  He told her he was driving to work.  His mother-in-law drove him back home and then he presented to the hospital.  He does not recall much of that morning.  CT head w/o contrast in the ED was negative.  Vital signs were wnl.  Utox negative.  No evidence of infection.  BAL was 0.02 g/dl.  NH4 level was elevated 137, thus suspected to have hepatic encephalopathy and was subsequently admitted.    Hospital Course   Gordon Guzman was admitted on 9/19/2017.  The following problems were addressed during his hospitalization:    1.   An episode of transient confusion and memory loss:   Mr. Guzman does not remember much of the morning of admission.  Please refer to above.  CT head w/o contrast was neg for acute pathology.  Utox at admit was negative.   He was afebrile and w/o leukocytosis thus infectious work up was not performed.  EEG was negative for seizure activity (but he was on Trileptal for neuropathy).  MRI brain was negative.  Incidentally his NH4 level was 137.  Pt has a h/o steatohepatitis w/ portal fibrosis per liver biopsy on 10/30/15.  Mr. Guzman denied excessive etoh consumption, however there was some alcohol detected in his blood.  Pt's symptoms was initially attributed to hepatic encephalopathy because of the elevated NH4 level, thus treated w/ lactulose.  Seen by GI consult service who felt that the elevated NH4 level was due to false positive since pt does not have portal HTN or cirrhosis, hence pt's symptoms were not due to HE.  Nonetheless, NH4 level came down to 55 following treatment w/ couple doses of lactulose.  His mental status cleared immediately at admissions.  Seen by neurology consult service who recommended the EEG and brain MRI, both were negative.  Per neurology, STEPHANIE may have contributed to pt's symptoms thus overnight oximetry on 9/20 was performed, normal reading.  Outpt formal over night sleep study to rule out STEPHANIE recommended.  Etiology of pt's transient confusion and memory loss remained elusive.  No driving for one month per neurology consult recommendation.  Both pt and pt's wife are aware of this recommendation.  F/u w/ PCP in 1 week.     2.   Transaminitis:   Alcoholic hepatitis vs SHEEHAN per liver biopsy in 2015.  He is a former heavy drinker but has cut back significantly, per his report. Admit AST/ALT ratio is c/w alcoholic hepatitis.  Abd US on 9/19 showed diffuse fatty infiltration of the liver o/w neg.  Liver biopy performed on 9/20/17, result pending at the time of this d/c.       3.   Pan ulcerative colitis:   Diagnosed in 2010.  He has been managed on Asacol since that time.  Last colonoscopy in 2015 was entirely normal.  Continue PTA Asacol and Questran (to help him have formed stools).     4.   Hypertension:    Under better control today.  Resume PTA amlodipine/benazepril.       5.   Alcohol abuse/dependence:  Patient has a history of alcohol abuse but has cut significantly back.  He still drinks alcohol frequently.  Recommend complete cessation.  No alcohol withdrawal symptoms throughout the hospital course.      6.   Decrease TSH and free T4 levels:   Likely euthyroid sickness.  Check levels in 4-6 wks.       7.   Chronic back pain:   Pt requested heating pad for home, order placed.      8.   Peripheral neuropathy:  Continue to hold Trileptal.  PCP to consider initiating pt on neurontin.        Prete Arnold MD.   Hospitalist.  [phone], pager.      Pending Results   These results will be followed up by PCP  Unresulted Labs Ordered in the Past 30 Days of this Admission     Date and Time Order Name Status Description    9/20/2017 0939 Surgical pathology exam In process     9/20/2017 0001 Hepatitis B Surface Antibody In process           Code Status   Full Code       Primary Care Physician   [facility]    Physical Exam   Temp: 97.8  F (36.6  C) Temp src: Oral BP: 139/78   Heart Rate: 99 Resp: 18 SpO2: 97 % O2 Device: None (Room air)    Vitals:     Vital Signs with Ranges  Temp:  [97.7  F (36.5  C)-98.3  F (36.8  C)] 97.8  F (36.6  C)  Heart Rate:  [81-99] 99  Resp:  [18] 18  BP: (139-163)/(78-98) 139/78  SpO2:  [95 %-97 %] 97 %  I/O last 3 completed shifts:  In: 3615 [P.O.:880; I.V.:2735]  Out: -     General: aao x 3, NAD.  HEENT:  NC/AT, PERRL, EOMI, neck supple, no thyromegaly, op clear, mmm.  CVS:  NL s 1 and s2, no m/r/g.  Lungs:  CTA B/L.   Abd:  Soft, + bs, NT, no rebound or gaurding, no fluid shift.  Ext:  No c/c.  Lymph:  No edema.  Neuro:  Nonfocal.  Musculoskeletal: No calf tenderness to palpation.    Skin:  No rash.  Psychiatry:  Mood and affect appropriate.    Discharge Disposition   Discharged to home  Condition at discharge: Stable    Consultations This Hospital Stay   GASTROENTEROLOGY IP  CONSULT  SOCIAL WORK IP CONSULT  NEUROLOGY IP CONSULT    Time Spent on this Encounter   IPreet, personally saw the patient today and spent greater than 30 minutes discharging this patient.    Discharge Orders     Reason for your hospital stay   An episode of transient confusion and memory loss of unclear etiology.     Follow-up and recommended labs and tests    Follow up with primary care provider, [facility], within 7 days.     Discharge Instructions   No driving for one month.     Activity   Your activity upon discharge: as tolerated but no driving for a month.     Diet   Follow this diet upon discharge:   Regular Diet Adult       Discharge Medications   Current Discharge Medication List      START taking these medications    Details   Heating Pads (DRY HEAT HEATING PAD DELUXE) PADS 1 Units every 6 hours as needed  Qty: 1 each, Refills: 0    Associated Diagnoses: Chronic low back pain without sciatica, unspecified back pain laterality         CONTINUE these medications which have NOT CHANGED    Details   cyanocobalamin (VITAMIN B12) 1000 MCG/ML injection Inject 1 mL into the muscle once a week       FOLIC ACID PO Take 1 mg by mouth daily      CYANOCOBALAMIN PO Take 1,000 mcg by mouth daily      Thiamine HCl (VITAMIN B-1 PO) Take 100 mg by mouth daily      Ondansetron HCl (ZOFRAN PO) Take 8 mg by mouth every 8 hours as needed for nausea or vomiting      amLODIPine-benazepril (LOTREL) 5-20 MG per capsule Take 1 capsule by mouth daily      HYDROcodone-acetaminophen (NORCO) 5-325 MG per tablet Take 1 tablet by mouth every 4 hours as needed for moderate to severe pain      testosterone (ANDROGEL 1.62% PUMP) 20.25 MG/ACT gel Place 3 pumps onto the skin daily as needed Prescription is written as daily, but he is using less often      Omega-3 Fatty Acids (OMEGA-3 FISH OIL PO) Take 1 g by mouth daily       multivitamin, therapeutic with minerals (MULTI-VITAMIN) TABS Take 1 tablet by mouth daily       mesalamine (ASACOL HD) 800 MG EC tablet Take 2,400 mg by mouth 2 times daily       cholestyramine (QUESTRAN) 4 G packet Take 1 packet by mouth daily      Esomeprazole Magnesium (NEXIUM PO) Take 40 mg by mouth 2 times daily (before meals)          STOP taking these medications       OXCARBAZEPINE PO Comments:   Reason for Stopping:         OXCARBAZEPINE PO Comments:   Reason for Stopping:             Allergies   No Known Allergies

## 2017-09-21 NOTE — PROCEDURES
ELECTROENCEPHALOGRAM      ORDERING PHYSICIAN:  Bernard Lee MD      EEG #        DATE OF EXAMINATION:  2017 at [facility].      An electroencephalogram is obtained with EKG monitoring.  There is an 8 Hz background frequency.  Heart rate is about 90 beats per minute.  The patient, Alejandra Soto, is noted to become drowsy with dropout of background activity. Technician notes snoring and there are spontaneous arousals frequently.  Mild levels of sleep are obtained.  There are again subsequent spontaneous arousals.  Hyperventilation is performed with no significant change.  Photic stimulation is unremarkable.       IMPRESSION:  This is a normal electroencephalogram during wakefulness, drowsiness and very brief sleep.  In particular, there are no significant asymmetries or epileptiform activity identified in this tracing.  It is noted that drowsiness and light sleep were obtained but patient did snore and wake himself up.  Obstructive sleep apnea should be considered.           BERNARD LEE MD             D: 2017 08:13   T: 2017 09:53   MT: [id number]      Name:     ALEJANDRA SOTO   MRN:      [id number]        Account:        [id number]   :      1965           Procedure Date: 2017      Document: [id number]       cc: Bernard Lee MD

## 2017-09-21 NOTE — PROGRESS NOTES
Pt A&Ox4. VSS with BP in the 160s. Independent in room. Good appetite and oral intake. EEG and MRI completed this evening. Pt started overnight cont oximetry tonight. Pt calls when needed.

## 2017-09-21 NOTE — PLAN OF CARE
Problem: Goal Outcome Summary  Goal: Goal Outcome Summary  Outcome: Improving  Pt A&Ox4. VSS ex BP elevated in 150s. C/O HA 3/10, relieved with tylenol x1. Up independently. Regular diet. CIWA 3,0. Neuros intact.Tele SR with 1 degree AVB. Overnight cont pulse ox study for suspected STEPHANIE completed. Continue to monitor.

## 2017-09-21 NOTE — PROGRESS NOTES
[facility]  Neuroscience and [facility] [place]  Neurology Daily Note     10/11/11 8:35 AM        Assessment and Plan: 1.   Episode of transient confusion. Etiology not certain.  2. MRI of the brain unremarkable as is his electroencephalogram.  3. I had not realized it yesterday, but he is on oxcarbazepine and apparently has been on it for many years. 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 This is, of course, an anticonvulsant and would certainly further lessen the likelihood of a seizure. However, it should be noted that this medication could be important in terms of hepatic issues. I mentioned gabapentin and he does not believe he was ever treated without medication. He treats for his primary physician, Dr. Berry. I suggested he talk to Dr. Berry about starting gabapentin and weaning off of oxcarbazepine. I indicated the medication should never be stopped abruptly. If he is going to remain on oxcarbazepine, a blood level should be checked.  4. I am told his overnight oximetry was unremarkable. This may make the possibility of obstructive sleep apnea less likely but I don't think it eliminates that possibility. He does complain he never feels rested in the morning. I would recommend a formal sleep study which could be done as an outpatient.  5. As noted above, the episode of transient confusion is of uncertain etiology. I am somewhat concerned in that there was some alcohol still detected in his system. I would recommend he stop all alcohol consumption. At this time I see no convincing evidence for seizures. Because of his occupation, and because of general safety concerns, I would recommend he not drive for a minimum of 1 month to be sure there is no recurrence of this type of episode.  6. We will sign off at this time    Attestation:  This patient was seen and evaluated by me, Bernard Alamo, separately from the house staff team or resident(s).  I have reviewed the note below and agree.           Interval History:   No new concerns           Review of Systems:   No new concerns          Medications:     Current Facility-Administered Medications   Medication     lidocaine (PF) (XYLOCAINE) 1 % injection  mg     midazolam (VERSED) injection 0.5-1 mg     flumazenil (ROMAZICON) injection 0.2 mg     fentaNYL (PF) (SUBLIMAZE) injection 25-50 mcg     naloxone (NARCAN) injection 0.1-0.4 mg     influenza quadrivalent (PF) vacc age 3 yrs and older (FLUZONE or Flulaval) injection 0.5 mL     lidocaine 1 % 1 mL     lidocaine (LMX4) cream     sodium chloride (PF) 0.9% PF flush 3 mL     sodium chloride (PF) 0.9% PF flush 3 mL     potassium chloride SA (K-DUR/KLOR-CON M) CR tablet 20-40 mEq     potassium chloride (KLOR-CON) Packet 20-40 mEq     potassium chloride 10 mEq in 100 mL intermittent infusion     potassium chloride 10 mEq in 100 mL intermittent infusion with 10 mg lidocaine     potassium chloride 20 mEq in 50 mL intermittent infusion     acetaminophen (TYLENOL) tablet 650 mg     ondansetron (ZOFRAN-ODT) ODT tab 4 mg    Or     ondansetron (ZOFRAN) injection 4 mg     prochlorperazine (COMPAZINE) injection 5-10 mg    Or     prochlorperazine (COMPAZINE) tablet 5-10 mg    Or     prochlorperazine (COMPAZINE) Suppository 25 mg     calcium carbonate (TUMS) chewable tablet 500-1,000 mg     diazepam (VALIUM) tablet 10 mg    Or     diazepam (VALIUM) injection 5-10 mg     QUEtiapine (SEROquel) tablet 25-50 mg     haloperidol lactate (HALDOL) injection 2 mg     cyanocobalamin (vitamin  B-12) tablet 1,000 mcg     folic acid (FOLVITE) tablet 1 mg     HYDROcodone-acetaminophen (NORCO) 5-325 MG per tablet 1 tablet     mesalamine (ASACOL HD) EC tablet 2,400 mg     multivitamin, therapeutic with minerals (THERA-VIT-M) tablet 1 tablet     OXcarbazepine (TRILEPTAL) tablet 600 mg     thiamine tablet 100 mg     hydrALAZINE (APRESOLINE) injection 10 mg     pantoprazole (PROTONIX) EC tablet 40 mg     amLODIPine (NORVASC) tablet 5 mg  "   And     lisinopril (PRINIVIL/ZESTRIL) tablet 20 mg     nicotine Patch in Place     nicotine patch REMOVAL     nicotine (NICODERM CQ) 14 MG/24HR 24 hr patch 1 patch     OXcarbazepine (TRILEPTAL) tablet 900 mg             Physical Exam:   Vitals: Blood pressure (!) 160/98, pulse 91, temperature 97.8  F (36.6  C), temperature source Oral, resp. rate 18, height 1.778 m (5' 10\"), SpO2 97 %.  . Neurologically unchanged from yesterday          Data:   ROUTINE IP LABS (Last 3results)  CBC RESULTS:     Recent Labs  Lab 09/20/17  0928 09/19/17  1208   WBC 6.1 4.5   RBC 4.08* 4.14*   HGB 13.7 13.7   HCT 38.1* 37.7*    204     Basic Metabolic Panel:    Recent Labs  Lab 09/20/17  0928 09/19/17  1208    135   POTASSIUM 3.7 3.3*   CHLORIDE 103 99   CO2 23 24   BUN 4* 6*   CR 0.64* 0.66   * 150*   EDELMIRA 8.8 8.7     INR:  Recent Labs  Lab 09/19/17  1208   INR 1.05      Lipid Profile:No results for input(s): CHOL, HDL, LDL, TRIG, CHOLHDLRATIO in the last 93042 hours.  TSH:  TSH   Date Value Ref Range Status   09/19/2017 0.32 (L) 0.40 - 4.00 mU/L Final   ,   Vitamin B12:   Lab Results   Component Value Date    B12 293 11/15/2006      A1C:   Lab Results   Component Value Date    A1C 6.3 09/20/2017     .                 "

## 2017-09-26 LAB — COPATH REPORT: NORMAL

## 2018-03-27 NOTE — PROGRESS NOTES
CYNDEE  D) MD order for SW consult related to chemical dependency noted.  I) RN Care Coordinator reports she spoke with patient and he is not interested in a CD consult.   A) Patient would likely have coverage for this under his [employer] insurance, should he change his mind.  P) SW will continue to be available if needed.   Pt comes in with increased dizziness for the last 3 days.  He states he was being seen for vertigo for the last few weeks and that this morning the dizziness was so bad he could not drive.  He states right ear ringing with slight nausea.  Denies any pain or other associate symptoms.

## 2018-11-11 NOTE — IP AVS SNAPSHOT
[facility] [address] [facility]    [address] [place] [address]    Phone:  [phone]                                       After Visit Summary   9/19/2017    Gordon Guzman    MRN: [id number]           After Visit Summary Signature Page     I have received my discharge instructions, and my questions have been answered. I have discussed any challenges I see with this plan with the nurse or doctor.    ..........................................................................................................................................  Patient/Patient Representative Signature      ..........................................................................................................................................  Patient Representative Print Name and Relationship to Patient    ..................................................               ................................................  Date                                            Time    ..........................................................................................................................................  Reviewed by Signature/Title    ...................................................              ..............................................  Date                                                            Time           Attending

## 2022-09-08 ENCOUNTER — OFFICE VISIT (OUTPATIENT)
Dept: URGENT CARE | Facility: URGENT CARE | Age: 57
End: 2022-09-08
Payer: COMMERCIAL

## 2022-09-08 VITALS
DIASTOLIC BLOOD PRESSURE: 74 MMHG | HEART RATE: 95 BPM | SYSTOLIC BLOOD PRESSURE: 164 MMHG | RESPIRATION RATE: 18 BRPM | TEMPERATURE: 98.8 F | WEIGHT: 268 LBS | BODY MASS INDEX: 38.45 KG/M2 | OXYGEN SATURATION: 96 %

## 2022-09-08 DIAGNOSIS — R05.9 COUGH: Primary | ICD-10-CM

## 2022-09-08 DIAGNOSIS — J40 BRONCHITIS: ICD-10-CM

## 2022-09-08 DIAGNOSIS — Z20.822 EXPOSURE TO 2019 NOVEL CORONAVIRUS: ICD-10-CM

## 2022-09-08 DIAGNOSIS — R06.02 SOB (SHORTNESS OF BREATH): ICD-10-CM

## 2022-09-08 LAB
BASOPHILS # BLD AUTO: 0.1 10E3/UL (ref 0–0.2)
BASOPHILS NFR BLD AUTO: 1 %
EOSINOPHIL # BLD AUTO: 0.1 10E3/UL (ref 0–0.7)
EOSINOPHIL NFR BLD AUTO: 2 %
ERYTHROCYTE [DISTWIDTH] IN BLOOD BY AUTOMATED COUNT: 11.8 % (ref 10–15)
HCT VFR BLD AUTO: 46.2 % (ref 40–53)
HGB BLD-MCNC: 15.9 G/DL (ref 13.3–17.7)
IMM GRANULOCYTES # BLD: 0 10E3/UL
IMM GRANULOCYTES NFR BLD: 0 %
LYMPHOCYTES # BLD AUTO: 2.7 10E3/UL (ref 0.8–5.3)
LYMPHOCYTES NFR BLD AUTO: 33 %
MCH RBC QN AUTO: 33.1 PG (ref 26.5–33)
MCHC RBC AUTO-ENTMCNC: 34.4 G/DL (ref 31.5–36.5)
MCV RBC AUTO: 96 FL (ref 78–100)
MONOCYTES # BLD AUTO: 0.8 10E3/UL (ref 0–1.3)
MONOCYTES NFR BLD AUTO: 10 %
NEUTROPHILS # BLD AUTO: 4.6 10E3/UL (ref 1.6–8.3)
NEUTROPHILS NFR BLD AUTO: 55 %
PLATELET # BLD AUTO: 223 10E3/UL (ref 150–450)
RBC # BLD AUTO: 4.81 10E6/UL (ref 4.4–5.9)
WBC # BLD AUTO: 8.2 10E3/UL (ref 4–11)

## 2022-09-08 PROCEDURE — 36415 COLL VENOUS BLD VENIPUNCTURE: CPT | Performed by: PHYSICIAN ASSISTANT

## 2022-09-08 PROCEDURE — U0003 INFECTIOUS AGENT DETECTION BY NUCLEIC ACID (DNA OR RNA); SEVERE ACUTE RESPIRATORY SYNDROME CORONAVIRUS 2 (SARS-COV-2) (CORONAVIRUS DISEASE [COVID-19]), AMPLIFIED PROBE TECHNIQUE, MAKING USE OF HIGH THROUGHPUT TECHNOLOGIES AS DESCRIBED BY CMS-2020-01-R: HCPCS | Performed by: PHYSICIAN ASSISTANT

## 2022-09-08 PROCEDURE — U0005 INFEC AGEN DETEC AMPLI PROBE: HCPCS | Performed by: PHYSICIAN ASSISTANT

## 2022-09-08 PROCEDURE — 85025 COMPLETE CBC W/AUTO DIFF WBC: CPT | Performed by: PHYSICIAN ASSISTANT

## 2022-09-08 PROCEDURE — 99204 OFFICE O/P NEW MOD 45 MIN: CPT | Mod: CS | Performed by: PHYSICIAN ASSISTANT

## 2022-09-08 RX ORDER — BENZONATATE 200 MG/1
200 CAPSULE ORAL 3 TIMES DAILY PRN
Qty: 25 CAPSULE | Refills: 0 | Status: SHIPPED | OUTPATIENT
Start: 2022-09-08

## 2022-09-08 RX ORDER — ALBUTEROL SULFATE 90 UG/1
2 AEROSOL, METERED RESPIRATORY (INHALATION) EVERY 6 HOURS
Qty: 18 G | Refills: 0 | Status: SHIPPED | OUTPATIENT
Start: 2022-09-08

## 2022-09-08 RX ORDER — NICOTINE 10 MG/ML
1 SPRAY, METERED NASAL
COMMUNITY
Start: 2008-03-04

## 2022-09-08 ASSESSMENT — ENCOUNTER SYMPTOMS
SHORTNESS OF BREATH: 1
SORE THROAT: 0
FATIGUE: 1
RHINORRHEA: 0
COUGH: 1
MYALGIAS: 0
VOMITING: 1
HEADACHES: 0
NAUSEA: 0
DIARRHEA: 0
APPETITE CHANGE: 0
FEVER: 0

## 2022-09-09 LAB — SARS-COV-2 RNA RESP QL NAA+PROBE: NEGATIVE

## 2022-09-09 NOTE — PROGRESS NOTES
SUBJECTIVE:   Gordon Guzman is a 56 year old male presenting with a chief complaint of   Chief Complaint   Patient presents with     Chest Pain     X 3 days and SOB.  Chest pain comes and goes happens when coughing, is easily SOB when moving around a lot       He is a new patient of [facility].  Patient presents with SOB and CP with coughing x 3 days.    Occasionally productive - green and yellow.  Intermittent ST - worse in morning.  No hx of COVID.  Vomited one time 2 days ago.      Treatment:  Nothing.      Review of Systems   Constitutional: Positive for fatigue. Negative for appetite change and fever.   HENT: Negative for congestion, ear pain, rhinorrhea and sore throat.    Respiratory: Positive for cough and shortness of breath.    Cardiovascular: Positive for chest pain.   Gastrointestinal: Positive for vomiting. Negative for diarrhea and nausea.   Musculoskeletal: Negative for myalgias.   Skin: Negative for rash.   Neurological: Negative for headaches.   All other systems reviewed and are negative.      Past Medical History:   Diagnosis Date     Hypertension      Ulcerative colitis (H)      History reviewed. No pertinent family history.  Current Outpatient Medications   Medication Sig Dispense Refill     albuterol (PROAIR HFA/PROVENTIL HFA/VENTOLIN HFA) 108 (90 Base) MCG/ACT inhaler Inhale 2 puffs into the lungs every 6 hours 18 g 0     amLODIPine-benazepril (LOTREL) 5-20 MG per capsule Take 1 capsule by mouth daily       benzonatate (TESSALON) 200 MG capsule Take 1 capsule (200 mg) by mouth 3 times daily as needed for cough 25 capsule 0     cholestyramine (QUESTRAN) 4 G packet Take 1 packet by mouth daily       cyanocobalamin (VITAMIN B12) 1000 MCG/ML injection Inject 1 mL into the muscle once a week        CYANOCOBALAMIN PO Take 1,000 mcg by mouth daily       Esomeprazole Magnesium (NEXIUM PO) Take 40 mg by mouth 2 times daily (before meals)        FOLIC ACID PO Take 1 mg by mouth daily       mesalamine  (ASACOL HD) 800 MG EC tablet Take 2,400 mg by mouth 2 times daily        multivitamin w/minerals (THERA-VIT-M) tablet Take 1 tablet by mouth daily       nicotine (NICOTROL NS) 10 MG/ML SOLN inhalation solution Spray 1 spray in nostril       Omega-3 Fatty Acids (OMEGA-3 FISH OIL PO) Take 1 g by mouth daily        Ondansetron HCl (ZOFRAN PO) Take 8 mg by mouth every 8 hours as needed for nausea or vomiting       testosterone (ANDROGEL 1.62 % PUMP) 20.25 MG/ACT gel Place 3 pumps onto the skin daily as needed Prescription is written as daily, but he is using less often       Thiamine HCl (VITAMIN B-1 PO) Take 100 mg by mouth daily       Heating Pads (DRY HEAT HEATING PAD DELUXE) PADS 1 Units every 6 hours as needed (Patient not taking: Reported on 9/8/2022) 1 each 0     HYDROcodone-acetaminophen (NORCO) 5-325 MG per tablet Take 1 tablet by mouth every 4 hours as needed for moderate to severe pain (Patient not taking: Reported on 9/8/2022)       Social History     Tobacco Use     Smoking status: Former Smoker     Smokeless tobacco: Never Used   Substance Use Topics     Alcohol use: Yes     Comment: daily, drank last night       OBJECTIVE  BP (!) 164/74   Pulse 95   Temp 98.8  F (37.1  C) (Oral)   Resp 18   Wt 121.6 kg (268 lb)   SpO2 96%   BMI 38.45 kg/m      Physical Exam  Vitals and nursing note reviewed.   Constitutional:       General: He is not in acute distress.     Appearance: Normal appearance. He is obese. He is not ill-appearing.   HENT:      Head: Normocephalic and atraumatic.      Right Ear: Tympanic membrane, ear canal and external ear normal.      Left Ear: Tympanic membrane, ear canal and external ear normal.      Nose: Nose normal.      Mouth/Throat:      Mouth: Mucous membranes are moist.      Pharynx: Oropharynx is clear.   Eyes:      Extraocular Movements: Extraocular movements intact.      Conjunctiva/sclera: Conjunctivae normal.   Cardiovascular:      Rate and Rhythm: Normal rate and regular  rhythm.      Pulses: Normal pulses.      Heart sounds: Normal heart sounds.   Pulmonary:      Effort: Pulmonary effort is normal.      Breath sounds: Normal breath sounds.   Musculoskeletal:      Cervical back: Normal range of motion and neck supple. No rigidity. No muscular tenderness.   Skin:     General: Skin is warm and dry.   Neurological:      General: No focal deficit present.      Mental Status: He is alert.   Psychiatric:         Mood and Affect: Mood normal.         Behavior: Behavior normal.         Labs:  Results for orders placed or performed in visit on 09/08/22 (from the past 24 hour(s))   CBC with platelets and differential    Narrative    The following orders were created for panel order CBC with platelets and differential.  Procedure                               Abnormality         Status                     ---------                               -----------         ------                     CBC with platelets and d...[[id number]]  Abnormal            Final result                 Please view results for these tests on the individual orders.   CBC with platelets and differential   Result Value Ref Range    WBC Count 8.2 4.0 - 11.0 10e3/uL    RBC Count 4.81 4.40 - 5.90 10e6/uL    Hemoglobin 15.9 13.3 - 17.7 g/dL    Hematocrit 46.2 40.0 - 53.0 %    MCV 96 78 - 100 fL    MCH 33.1 (H) 26.5 - 33.0 pg    MCHC 34.4 31.5 - 36.5 g/dL    RDW 11.8 10.0 - 15.0 %    Platelet Count 223 150 - 450 10e3/uL    % Neutrophils 55 %    % Lymphocytes 33 %    % Monocytes 10 %    % Eosinophils 2 %    % Basophils 1 %    % Immature Granulocytes 0 %    Absolute Neutrophils 4.6 1.6 - 8.3 10e3/uL    Absolute Lymphocytes 2.7 0.8 - 5.3 10e3/uL    Absolute Monocytes 0.8 0.0 - 1.3 10e3/uL    Absolute Eosinophils 0.1 0.0 - 0.7 10e3/uL    Absolute Basophils 0.1 0.0 - 0.2 10e3/uL    Absolute Immature Granulocytes 0.0 <=0.4 10e3/uL   XR Chest 2 Views    Narrative    EXAM: XR CHEST 2 VIEWS  LOCATION: [facility]  [facility]  DATE/TIME: 9/8/2022 8:21 PM    INDICATION:  Cough  COMPARISON: None.      Impression    IMPRESSION: Negative chest.       X-Ray was done, my findings are: nad    ASSESSMENT:      ICD-10-CM    1. Cough  R05.9 XR Chest 2 Views     Symptomatic; Unknown COVID-19 Virus (Coronavirus) by PCR Nose     benzonatate (TESSALON) 200 MG capsule   2. Exposure to 2019 novel coronavirus  Z20.822 CBC with platelets and differential     CBC with platelets and differential   3. SOB (shortness of breath)  R06.02 albuterol (PROAIR HFA/PROVENTIL HFA/VENTOLIN HFA) 108 (90 Base) MCG/ACT inhaler   4. Bronchitis  J40         Medical Decision Making:    Differential Diagnosis:  URI Adult/Peds:  Bronchitis-viral, Pneumonia, Viral upper respiratory illness and covid, hypoventilation syndrome, sleep apnea.      Serious Comorbid Conditions:  Adult:  reviewed    PLAN:    Rx for tessalon perles, albuterol inhaler.  Discussed reasons to seek immediate medical attention.  Additionally if no improvement or worsening in one week, may follow up with PCP and/or UC.    Discussed and recommended CDC guidelines for self isolation.  COVID test pending.        Followup:    If not improving or if condition worsens, follow up with your Primary Care Provider, If not improving or if conditions worsens over the next 12-24 hours, go to the Emergency Department    There are no Patient Instructions on file for this visit.

## 2022-09-10 ENCOUNTER — NURSE TRIAGE (OUTPATIENT)
Dept: NURSING | Facility: CLINIC | Age: 57
End: 2022-09-10

## 2022-09-10 NOTE — TELEPHONE ENCOUNTER
Coronavirus (COVID-19) Notification    Lab Result   Lab test 2019-nCoV rRt-PCR OR SARS-COV-2 PCR    Nasopharyngeal AND/OR Oropharyngeal swab is NEGATIVE for 2019-nCoV RNA [OR] SARS-COV-2 RNA (COVID-19) RNA    Your result was negative. This means that we didn't find the virus that causes COVID-19 in your sample. A test may show negative when you do actually have the virus. This can happen when the virus is in the early stages of infection, before you feel illness symptoms.    If you have symptoms  Stay home and away from others (self-isolate) until you meet ALL of the guidelines below:    You've had no fever--and no medicine that reduces fever--for 1 full day (24 hours). And      Your other symptoms have gotten better. For example, your cough or breathing has improved. And   ? At least 10 days have passed since your symptoms started. (If you've been told by a doctor that you have a weak immune system, wait 20 days.)     During this time    Stay home. Don't go to work, school or anywhere else.     Stay in your own room, including for meals. Use your own bathroom if you can.    Stay away from others in your home. No hugging, kissing or shaking hands. No visitors.    Clean  high touch  surfaces often (doorknobs, counters, handles, etc.). Use a household cleaning spray or wipes. You can find a full list on the EPA website at www.epa.gov/pesticide-registration/list-n-disinfectants-use-against-sars-cov-2.    Cover your mouth and nose with a mask, tissue or other face covering to avoid spreading germs.    Wash your hands and face often with soap and water.    Going back to work  Check with your employer for any guidelines to follow for going back to work.  You are sent a letter for your Employer which will serve as formal document notice that you, the employee, tested negative for COVID-19, as of the testing date shown above.    If your symptoms worsen or other concerning symptoms, contact PCP, oncare or consider returning  to Emergency Dept.    Where can I get more information?    [facility]: [email or web address]    Coronavirus Basics: www.health.[facility].[place]/diseases/coronavirus/basics.html    [facility] Hotline (534-373-7763)    BARRINGTON SHUKLA RN    Patient states he will take another covid test today and call back if it is positive. States that he is having some shortness of breath with activity. States he has a productive cough with green/yellow sputum. He was seen in urgent care on Thursday and was prescribed an albuterol inhaler and benzonatate for his cough. Pt is wondering if he should be on antibiotics.     Pt advised that he can go back to urgent care or call his PCP's office (which is an [facility]).     Pt stated that he will take another covid test and call back if it is positive or wait to see his PCP on Monday where he already has an appointment.     Patient given care advice per protocol. Pt advised agreement and understanding of the plan of care.     BARRINGTON SHUKLA RN    Reason for Disposition    MILD difficulty breathing (e.g., minimal/no SOB at rest, SOB with walking, pulse <100)    Additional Information    Negative: SEVERE difficulty breathing (e.g., struggling for each breath, speaks in single words)    Negative: Difficult to awaken or acting confused (e.g., disoriented, slurred speech)    Negative: Bluish (or gray) lips or face now    Negative: Shock suspected (e.g., cold/pale/clammy skin, too weak to stand, low BP, rapid pulse)    Negative: Sounds like a life-threatening emergency to the triager    Negative: [1] Diagnosed or suspected COVID-19 AND [2] symptoms lasting 3 or more weeks    Negative: [1] COVID-19 exposure AND [2] no symptoms    Negative: COVID-19 vaccine reaction suspected (e.g., fever, headache, muscle aches) occurring 1 to 3 days after getting vaccine    Negative: COVID-19 vaccine, questions about    Negative: [1] Lives with someone known to have influenza (flu test  positive) AND [2] flu-like symptoms (e.g., cough, runny nose, sore throat, SOB; with or without fever)    Negative: [1] Adult with possible COVID-19 symptoms AND [2] triager concerned about severity of symptoms or other causes    Negative: COVID-19 and breastfeeding, questions about    Negative: SEVERE or constant chest pain or pressure  (Exception: Mild central chest pain, present only when coughing.)    Negative: MODERATE difficulty breathing (e.g., speaks in phrases, SOB even at rest, pulse 100-120)    Negative: [1] Headache AND [2] stiff neck (can't touch chin to chest)    Negative: Oxygen level (e.g., pulse oximetry) 90 percent or lower    Negative: Chest pain or pressure    Negative: Patient sounds very sick or weak to the triager    Commented on: All Negative - Go to ED Now     Unable to assess    Protocols used: CORONAVIRUS (COVID-19) DIAGNOSED OR [id number] 1.18.2022

## (undated) RX ORDER — FENTANYL CITRATE 50 UG/ML
INJECTION, SOLUTION INTRAMUSCULAR; INTRAVENOUS
Status: DISPENSED
Start: 2017-09-20

## (undated) RX ORDER — NALOXONE HYDROCHLORIDE 0.4 MG/ML
INJECTION, SOLUTION INTRAMUSCULAR; INTRAVENOUS; SUBCUTANEOUS
Status: DISPENSED
Start: 2017-09-20

## (undated) RX ORDER — FLUMAZENIL 0.1 MG/ML
INJECTION, SOLUTION INTRAVENOUS
Status: DISPENSED
Start: 2017-09-20